# Patient Record
Sex: MALE | Race: WHITE | NOT HISPANIC OR LATINO | URBAN - METROPOLITAN AREA
[De-identification: names, ages, dates, MRNs, and addresses within clinical notes are randomized per-mention and may not be internally consistent; named-entity substitution may affect disease eponyms.]

---

## 2018-03-11 ENCOUNTER — INPATIENT (INPATIENT)
Facility: HOSPITAL | Age: 63
LOS: 1 days | Discharge: HOME | End: 2018-03-13
Attending: INTERNAL MEDICINE

## 2018-03-11 VITALS
TEMPERATURE: 97 F | HEART RATE: 77 BPM | RESPIRATION RATE: 18 BRPM | SYSTOLIC BLOOD PRESSURE: 158 MMHG | DIASTOLIC BLOOD PRESSURE: 93 MMHG | OXYGEN SATURATION: 97 %

## 2018-03-11 LAB
ALBUMIN SERPL ELPH-MCNC: 3.9 G/DL — SIGNIFICANT CHANGE UP (ref 3–5.5)
ALP SERPL-CCNC: 46 U/L — SIGNIFICANT CHANGE UP (ref 30–115)
ALT FLD-CCNC: <5 U/L — SIGNIFICANT CHANGE UP (ref 0–41)
ANION GAP SERPL CALC-SCNC: 3 MMOL/L — LOW (ref 7–14)
AST SERPL-CCNC: <5 U/L — SIGNIFICANT CHANGE UP (ref 0–41)
BASOPHILS # BLD AUTO: 0.05 K/UL — SIGNIFICANT CHANGE UP (ref 0–0.2)
BASOPHILS NFR BLD AUTO: 0.7 % — SIGNIFICANT CHANGE UP (ref 0–1)
BILIRUB SERPL-MCNC: 3.3 MG/DL — HIGH (ref 0.2–1.2)
BUN SERPL-MCNC: 22 MG/DL — HIGH (ref 10–20)
CALCIUM SERPL-MCNC: 8.7 MG/DL — SIGNIFICANT CHANGE UP (ref 8.5–10.1)
CHLORIDE SERPL-SCNC: 109 MMOL/L — SIGNIFICANT CHANGE UP (ref 98–110)
CK MB BLD-MCNC: 1 % — SIGNIFICANT CHANGE UP (ref 0–4)
CK MB CFR SERPL CALC: 3.1 NG/ML — SIGNIFICANT CHANGE UP (ref 0.6–6.3)
CK SERPL-CCNC: 334 U/L — HIGH (ref 0–225)
CO2 SERPL-SCNC: 23 MMOL/L — SIGNIFICANT CHANGE UP (ref 17–32)
CREAT SERPL-MCNC: 1 MG/DL — SIGNIFICANT CHANGE UP (ref 0.7–1.5)
EOSINOPHIL # BLD AUTO: 0.17 K/UL — SIGNIFICANT CHANGE UP (ref 0–0.7)
EOSINOPHIL NFR BLD AUTO: 2.4 % — SIGNIFICANT CHANGE UP (ref 0–8)
GLUCOSE SERPL-MCNC: 104 MG/DL — SIGNIFICANT CHANGE UP (ref 70–110)
HCT VFR BLD CALC: 43.4 % — SIGNIFICANT CHANGE UP (ref 42–52)
HGB BLD-MCNC: 14.6 G/DL — SIGNIFICANT CHANGE UP (ref 14–18)
IMM GRANULOCYTES NFR BLD AUTO: 0.4 % — HIGH (ref 0.1–0.3)
LYMPHOCYTES # BLD AUTO: 2.33 K/UL — SIGNIFICANT CHANGE UP (ref 1.2–3.4)
LYMPHOCYTES # BLD AUTO: 33 % — SIGNIFICANT CHANGE UP (ref 20.5–51.1)
MCHC RBC-ENTMCNC: 28.2 PG — SIGNIFICANT CHANGE UP (ref 27–31)
MCHC RBC-ENTMCNC: 33.6 G/DL — SIGNIFICANT CHANGE UP (ref 32–37)
MCV RBC AUTO: 83.8 FL — SIGNIFICANT CHANGE UP (ref 80–94)
MONOCYTES # BLD AUTO: 0.52 K/UL — SIGNIFICANT CHANGE UP (ref 0.1–0.6)
MONOCYTES NFR BLD AUTO: 7.4 % — SIGNIFICANT CHANGE UP (ref 1.7–9.3)
NEUTROPHILS # BLD AUTO: 3.96 K/UL — SIGNIFICANT CHANGE UP (ref 1.4–6.5)
NEUTROPHILS NFR BLD AUTO: 56.1 % — SIGNIFICANT CHANGE UP (ref 42.2–75.2)
POTASSIUM SERPL-MCNC: 10.6 MMOL/L — CRITICAL HIGH (ref 3.5–5)
POTASSIUM SERPL-SCNC: 10.6 MMOL/L — CRITICAL HIGH (ref 3.5–5)
PROT SERPL-MCNC: SIGNIFICANT CHANGE UP G/DL (ref 6–8)
RBC # BLD: 5.18 M/UL — SIGNIFICANT CHANGE UP (ref 4.7–6.1)
RBC # FLD: 12.5 % — SIGNIFICANT CHANGE UP (ref 11.5–14.5)
SODIUM SERPL-SCNC: 135 MMOL/L — SIGNIFICANT CHANGE UP (ref 135–146)
TROPONIN I SERPL-MCNC: 0.02 NG/ML — SIGNIFICANT CHANGE UP (ref 0–0.05)
WBC # BLD: 7.06 K/UL — SIGNIFICANT CHANGE UP (ref 4.8–10.8)
WBC # FLD AUTO: 7.06 K/UL — SIGNIFICANT CHANGE UP (ref 4.8–10.8)

## 2018-03-11 RX ORDER — ASPIRIN/CALCIUM CARB/MAGNESIUM 324 MG
325 TABLET ORAL ONCE
Qty: 0 | Refills: 0 | Status: COMPLETED | OUTPATIENT
Start: 2018-03-11 | End: 2018-03-11

## 2018-03-11 RX ORDER — SODIUM CHLORIDE 9 MG/ML
3 INJECTION INTRAMUSCULAR; INTRAVENOUS; SUBCUTANEOUS ONCE
Qty: 0 | Refills: 0 | Status: COMPLETED | OUTPATIENT
Start: 2018-03-11 | End: 2018-03-11

## 2018-03-11 RX ADMIN — Medication 325 MILLIGRAM(S): at 22:52

## 2018-03-11 RX ADMIN — SODIUM CHLORIDE 3 MILLILITER(S): 9 INJECTION INTRAMUSCULAR; INTRAVENOUS; SUBCUTANEOUS at 22:52

## 2018-03-12 LAB
ALBUMIN SERPL ELPH-MCNC: 4.4 G/DL — SIGNIFICANT CHANGE UP (ref 3–5.5)
ALP SERPL-CCNC: 48 U/L — SIGNIFICANT CHANGE UP (ref 30–115)
ALT FLD-CCNC: 19 U/L — SIGNIFICANT CHANGE UP (ref 0–41)
ANION GAP SERPL CALC-SCNC: 9 MMOL/L — SIGNIFICANT CHANGE UP (ref 7–14)
AST SERPL-CCNC: 19 U/L — SIGNIFICANT CHANGE UP (ref 0–41)
BILIRUB DIRECT SERPL-MCNC: 0.2 MG/DL — SIGNIFICANT CHANGE UP (ref 0–0.2)
BILIRUB INDIRECT FLD-MCNC: 1 MG/DL — SIGNIFICANT CHANGE UP
BILIRUB SERPL-MCNC: 1.2 MG/DL — SIGNIFICANT CHANGE UP (ref 0.2–1.2)
BUN SERPL-MCNC: 18 MG/DL — SIGNIFICANT CHANGE UP (ref 10–20)
CALCIUM SERPL-MCNC: 9.5 MG/DL — SIGNIFICANT CHANGE UP (ref 8.5–10.1)
CHLORIDE SERPL-SCNC: 106 MMOL/L — SIGNIFICANT CHANGE UP (ref 98–110)
CHOLEST SERPL-MCNC: 273 MG/DL — HIGH (ref 100–200)
CK MB CFR SERPL CALC: 3.2 NG/ML — SIGNIFICANT CHANGE UP (ref 0.6–6.3)
CO2 SERPL-SCNC: 25 MMOL/L — SIGNIFICANT CHANGE UP (ref 17–32)
CREAT SERPL-MCNC: 1.1 MG/DL — SIGNIFICANT CHANGE UP (ref 0.7–1.5)
GAS PNL BLDV: SIGNIFICANT CHANGE UP
GLUCOSE SERPL-MCNC: 99 MG/DL — SIGNIFICANT CHANGE UP (ref 70–110)
HCT VFR BLD CALC: 46 % — SIGNIFICANT CHANGE UP (ref 42–52)
HDLC SERPL-MCNC: 44 MG/DL — SIGNIFICANT CHANGE UP (ref 40–60)
HGB BLD-MCNC: 15.4 G/DL — SIGNIFICANT CHANGE UP (ref 14–18)
LIPID PNL WITH DIRECT LDL SERPL: 183 MG/DL — HIGH (ref 50–100)
MCHC RBC-ENTMCNC: 28.1 PG — SIGNIFICANT CHANGE UP (ref 27–31)
MCHC RBC-ENTMCNC: 33.5 G/DL — SIGNIFICANT CHANGE UP (ref 32–37)
MCV RBC AUTO: 83.9 FL — SIGNIFICANT CHANGE UP (ref 80–94)
NRBC # BLD: 0 /100 WBCS — SIGNIFICANT CHANGE UP (ref 0–0)
PLATELET # BLD AUTO: 119 K/UL — LOW (ref 130–400)
PLATELET # BLD AUTO: 192 K/UL — SIGNIFICANT CHANGE UP (ref 130–400)
POTASSIUM SERPL-MCNC: 4 MMOL/L — SIGNIFICANT CHANGE UP (ref 3.5–5)
POTASSIUM SERPL-SCNC: 4 MMOL/L — SIGNIFICANT CHANGE UP (ref 3.5–5)
PROT SERPL-MCNC: 6.8 G/DL — SIGNIFICANT CHANGE UP (ref 6–8)
RBC # BLD: 5.48 M/UL — SIGNIFICANT CHANGE UP (ref 4.7–6.1)
RBC # FLD: 12.3 % — SIGNIFICANT CHANGE UP (ref 11.5–14.5)
SODIUM SERPL-SCNC: 140 MMOL/L — SIGNIFICANT CHANGE UP (ref 135–146)
TOTAL CHOLESTEROL/HDL RATIO MEASUREMENT: 6.2 RATIO — HIGH (ref 4–5.5)
TRIGL SERPL-MCNC: 193 MG/DL — HIGH (ref 40–150)
TROPONIN I SERPL-MCNC: 0.04 NG/ML — SIGNIFICANT CHANGE UP (ref 0–0.05)
WBC # BLD: 7.4 K/UL — SIGNIFICANT CHANGE UP (ref 4.8–10.8)
WBC # FLD AUTO: 7.4 K/UL — SIGNIFICANT CHANGE UP (ref 4.8–10.8)

## 2018-03-12 RX ORDER — DOCUSATE SODIUM 100 MG
100 CAPSULE ORAL THREE TIMES A DAY
Qty: 0 | Refills: 0 | Status: DISCONTINUED | OUTPATIENT
Start: 2018-03-12 | End: 2018-03-13

## 2018-03-12 RX ORDER — SENNA PLUS 8.6 MG/1
2 TABLET ORAL AT BEDTIME
Qty: 0 | Refills: 0 | Status: DISCONTINUED | OUTPATIENT
Start: 2018-03-12 | End: 2018-03-13

## 2018-03-12 RX ORDER — VERAPAMIL HCL 240 MG
240 CAPSULE, EXTENDED RELEASE PELLETS 24 HR ORAL DAILY
Qty: 0 | Refills: 0 | Status: DISCONTINUED | OUTPATIENT
Start: 2018-03-12 | End: 2018-03-13

## 2018-03-12 RX ORDER — ATORVASTATIN CALCIUM 80 MG/1
1 TABLET, FILM COATED ORAL
Qty: 0 | Refills: 0 | COMMUNITY

## 2018-03-12 RX ORDER — ENOXAPARIN SODIUM 100 MG/ML
40 INJECTION SUBCUTANEOUS AT BEDTIME
Qty: 0 | Refills: 0 | Status: DISCONTINUED | OUTPATIENT
Start: 2018-03-12 | End: 2018-03-13

## 2018-03-12 RX ORDER — PROPRANOLOL HCL 160 MG
40 CAPSULE, EXTENDED RELEASE 24HR ORAL DAILY
Qty: 0 | Refills: 0 | Status: DISCONTINUED | OUTPATIENT
Start: 2018-03-12 | End: 2018-03-13

## 2018-03-12 RX ORDER — VALSARTAN 80 MG/1
80 TABLET ORAL DAILY
Qty: 0 | Refills: 0 | Status: DISCONTINUED | OUTPATIENT
Start: 2018-03-12 | End: 2018-03-13

## 2018-03-12 RX ORDER — ACETAMINOPHEN 500 MG
650 TABLET ORAL EVERY 6 HOURS
Qty: 0 | Refills: 0 | Status: DISCONTINUED | OUTPATIENT
Start: 2018-03-12 | End: 2018-03-13

## 2018-03-12 RX ORDER — ASPIRIN/CALCIUM CARB/MAGNESIUM 324 MG
325 TABLET ORAL DAILY
Qty: 0 | Refills: 0 | Status: DISCONTINUED | OUTPATIENT
Start: 2018-03-12 | End: 2018-03-13

## 2018-03-12 RX ORDER — ADENOSINE 3 MG/ML
60 INJECTION INTRAVENOUS ONCE
Qty: 0 | Refills: 0 | Status: COMPLETED | OUTPATIENT
Start: 2018-03-12 | End: 2018-03-13

## 2018-03-12 RX ORDER — MORPHINE SULFATE 50 MG/1
5 CAPSULE, EXTENDED RELEASE ORAL EVERY 4 HOURS
Qty: 0 | Refills: 0 | Status: DISCONTINUED | OUTPATIENT
Start: 2018-03-12 | End: 2018-03-13

## 2018-03-12 RX ORDER — VALSARTAN 80 MG/1
0 TABLET ORAL
Qty: 30 | Refills: 0 | COMMUNITY

## 2018-03-12 RX ORDER — VERAPAMIL HCL 240 MG
0 CAPSULE, EXTENDED RELEASE PELLETS 24 HR ORAL
Qty: 30 | Refills: 0 | COMMUNITY

## 2018-03-12 RX ORDER — ATORVASTATIN CALCIUM 80 MG/1
20 TABLET, FILM COATED ORAL AT BEDTIME
Qty: 0 | Refills: 0 | Status: DISCONTINUED | OUTPATIENT
Start: 2018-03-12 | End: 2018-03-13

## 2018-03-12 RX ADMIN — Medication 240 MILLIGRAM(S): at 06:16

## 2018-03-12 RX ADMIN — Medication 40 MILLIGRAM(S): at 06:16

## 2018-03-12 RX ADMIN — ATORVASTATIN CALCIUM 20 MILLIGRAM(S): 80 TABLET, FILM COATED ORAL at 22:34

## 2018-03-12 RX ADMIN — Medication 325 MILLIGRAM(S): at 11:48

## 2018-03-12 RX ADMIN — Medication 100 MILLIGRAM(S): at 06:15

## 2018-03-12 RX ADMIN — VALSARTAN 80 MILLIGRAM(S): 80 TABLET ORAL at 06:16

## 2018-03-12 NOTE — ED PROVIDER NOTE - PROGRESS NOTE DETAILS
patient hasw improved I will admit for further monitoring with muliple sets of troponin and potential provacative testing

## 2018-03-12 NOTE — ED ADULT NURSE REASSESSMENT NOTE - NS ED NURSE REASSESS COMMENT FT1
Pt had 2nd episode of right sided chest pain, lasted approx 10 mins; EKG normal; pain has since subsided; attached to cardiac monitor

## 2018-03-12 NOTE — CONSULT NOTE ADULT - SUBJECTIVE AND OBJECTIVE BOX
Date of Admission: 3/11/2018    CHIEF COMPLAINT: chest pain    HISTORY OF PRESENT ILLNESS: 62y Male with PMH below presented to the hospital for two episodes of chest pain yesterday. first time was while walking his dog and second time while he was lying in bed. It was associated with some diaphoresis and some nausea.     PAST MEDICAL & SURGICAL HISTORY:  Dyslipidemia  Hypertension  Atrial fibrillation  No significant past surgical history    HEALTH ISSUES - PROBLEM Dx:        FAMILY HISTORY:  Family history of coronary artery disease (Father, Uncle)    Allergies    No Known Allergies    Intolerances    	  Home Medications:  aspirin 325 mg oral tablet: 1 tab(s) orally once a day (12 Mar 2018 02:15)  atorvastatin 20 mg oral tablet: 1 tab(s) orally once a day (12 Mar 2018 02:15)  propranolol 40 mg oral tablet: 1 tab(s) orally once a day (12 Mar 2018 02:15)  valsartan 80 mg oral tablet: 1 tab(s) orally once a day (12 Mar 2018 02:15)  verapamil 240 mg/24 hours oral capsule, extended release: 1 cap(s) orally once a day (12 Mar 2018 02:15)    MEDICATIONS  (STANDING):  aspirin 325 milliGRAM(s) Oral daily  atorvastatin 20 milliGRAM(s) Oral at bedtime  docusate sodium 100 milliGRAM(s) Oral three times a day  enoxaparin Injectable 40 milliGRAM(s) SubCutaneous at bedtime  propranolol 40 milliGRAM(s) Oral daily  valsartan 80 milliGRAM(s) Oral daily  verapamil  milliGRAM(s) Oral daily    MEDICATIONS  (PRN):  acetaminophen   Tablet. 650 milliGRAM(s) Oral every 6 hours PRN Mild Pain (1 - 3)  morphine  - Injectable 5 milliGRAM(s) IV Push every 4 hours PRN Moderate Pain (4 - 6)  senna 2 Tablet(s) Oral at bedtime PRN Constipation              SOCIAL HISTORY:    [ ] Non-smoker  [ ] Smoker  [ ] Alcohol      REVIEW OF SYSTEMS:  CONSTITUTIONAL: No fever, weight loss, or fatigue  CARDIOLOGY: see HPI  RESPIRATORY: See HPI  NEUROLOGICAL: NO weakness, no focal deficits to report.  ENDOCRINOLOGICAL: no recent change in diabetic medications.   GI: no BRBPR, no N,V,diarrhea.    PSYCHIATRY: normal mood and affect  HEENT: no nasal discharge, no ecchymosis  SKIN: no ecchymosis, no breakdown  MUSCULOSKELETAL: Full range of motion x4.      PHYSICAL EXAM:  T(C): 36.9 (03-12-18 @ 07:35), Max: 36.9 (03-12-18 @ 07:35)  HR: 70 (03-12-18 @ 07:35) (68 - 82)  BP: 118/81 (03-12-18 @ 07:35) (117/79 - 158/93)  RR: 18 (03-12-18 @ 07:35) (18 - 18)  SpO2: 97% (03-12-18 @ 07:35) (95% - 98%)  Wt(kg): --  I&O's Summary    Daily     Daily     General Appearance: Normal	  Cardiovascular: Normal S1 S2, No JVD, No murmurs, No edema  Respiratory: Lungs clear to auscultation	  Psychiatry: A & O x 3, Mood & affect appropriate  Gastrointestinal:  Soft, Non-tender  Skin: No rashes, No ecchymoses, No cyanosis	  Neurologic: Non-focal  Extremities: Normal range of motion, No clubbing, cyanosis or edema  Vascular: Peripheral pulses palpable 2+ bilaterally        LABS:	 	                          15.4   7.40  )-----------( 192      ( 12 Mar 2018 07:09 )             46.0     03-12    140  |  106  |  18  ----------------------------<  99  4.0   |  25  |  1.1    Ca    9.5      12 Mar 2018 07:09    TPro  6.8  /  Alb  4.4  /  TBili  1.2  /  DBili  0.2  /  AST  19  /  ALT  19  /  AlkPhos  48  03-12    CARDIAC MARKERS ( 12 Mar 2018 07:09 )  0.04 ng/mL / x     / x     / x     / 3.2 ng/mL  CARDIAC MARKERS ( 11 Mar 2018 22:30 )  0.02 ng/mL / x     / 334 U/L / x     / 3.1 ng/mL          proBNP:   Lipid Profile:   HgA1c:   TSH:       CARDIAC MARKERS:  Troponin I, Serum: 0.04 ng/mL (03-12 @ 07:09)  Troponin I, Serum: 0.02 ng/mL (03-11 @ 22:30)            TELEMETRY EVENTS: 	    ECG:  	  RADIOLOGY:  OTHER: 	    PREVIOUS DIAGNOSTIC TESTING:    [ ] Echocardiogram:  [ ]  Catheterization:  [ ] Stress Test:  	  	  ASSESSMENT/PLAN: Date of Admission: 3/11/2018    CHIEF COMPLAINT: chest pain    HISTORY OF PRESENT ILLNESS: 62y Male with PMH below presented to the hospital for two episodes of chest pain yesterday. first time was while walking his dog and second time while he was lying in bed. It was associated with some diaphoresis and some nausea. Pain is right sided and intermittent. Had independent of these symptoms one episode of shortness of breath while walking his dog. NO exertional symptoms.     PAST MEDICAL & SURGICAL HISTORY:  Dyslipidemia  Hypertension  Atrial fibrillation  No significant past surgical history    HEALTH ISSUES - PROBLEM Dx:        FAMILY HISTORY:  Family history of coronary artery disease (Father, Uncle)    Allergies    No Known Allergies    Intolerances    	  Home Medications:  aspirin 325 mg oral tablet: 1 tab(s) orally once a day (12 Mar 2018 02:15)  atorvastatin 20 mg oral tablet: 1 tab(s) orally once a day (12 Mar 2018 02:15)  propranolol 40 mg oral tablet: 1 tab(s) orally once a day (12 Mar 2018 02:15)  valsartan 80 mg oral tablet: 1 tab(s) orally once a day (12 Mar 2018 02:15)  verapamil 240 mg/24 hours oral capsule, extended release: 1 cap(s) orally once a day (12 Mar 2018 02:15)    MEDICATIONS  (STANDING):  aspirin 325 milliGRAM(s) Oral daily  atorvastatin 20 milliGRAM(s) Oral at bedtime  docusate sodium 100 milliGRAM(s) Oral three times a day  enoxaparin Injectable 40 milliGRAM(s) SubCutaneous at bedtime  propranolol 40 milliGRAM(s) Oral daily  valsartan 80 milliGRAM(s) Oral daily  verapamil  milliGRAM(s) Oral daily    MEDICATIONS  (PRN):  acetaminophen   Tablet. 650 milliGRAM(s) Oral every 6 hours PRN Mild Pain (1 - 3)  morphine  - Injectable 5 milliGRAM(s) IV Push every 4 hours PRN Moderate Pain (4 - 6)  senna 2 Tablet(s) Oral at bedtime PRN Constipation              SOCIAL HISTORY:    [ ] Non-smoker  [ ] Smoker  [ ] Alcohol      REVIEW OF SYSTEMS:  CONSTITUTIONAL: No fever, weight loss, or fatigue  CARDIOLOGY: see HPI  RESPIRATORY: See HPI  NEUROLOGICAL: NO weakness, no focal deficits to report.  ENDOCRINOLOGICAL: no recent change in diabetic medications.   GI: no BRBPR, no N,V,diarrhea.    PSYCHIATRY: normal mood and affect  HEENT: no nasal discharge, no ecchymosis  SKIN: no ecchymosis, no breakdown  MUSCULOSKELETAL: Full range of motion x4.      PHYSICAL EXAM:  T(C): 36.9 (03-12-18 @ 07:35), Max: 36.9 (03-12-18 @ 07:35)  HR: 70 (03-12-18 @ 07:35) (68 - 82)  BP: 118/81 (03-12-18 @ 07:35) (117/79 - 158/93)  RR: 18 (03-12-18 @ 07:35) (18 - 18)  SpO2: 97% (03-12-18 @ 07:35) (95% - 98%)  Wt(kg): --  I&O's Summary    Daily     Daily     General Appearance: Normal	  Cardiovascular: Normal S1 S2, No JVD, No murmurs, No edema  Respiratory: Lungs clear to auscultation	  Psychiatry: A & O x 3, Mood & affect appropriate  Gastrointestinal:  Soft, Non-tender  Skin: No rashes, No ecchymoses, No cyanosis	  Neurologic: Non-focal  Extremities: Normal range of motion, No clubbing, cyanosis or edema  Vascular: Peripheral pulses palpable 2+ bilaterally        LABS:	 	                          15.4   7.40  )-----------( 192      ( 12 Mar 2018 07:09 )             46.0     03-12    140  |  106  |  18  ----------------------------<  99  4.0   |  25  |  1.1    Ca    9.5      12 Mar 2018 07:09    TPro  6.8  /  Alb  4.4  /  TBili  1.2  /  DBili  0.2  /  AST  19  /  ALT  19  /  AlkPhos  48  03-12    CARDIAC MARKERS ( 12 Mar 2018 07:09 )  0.04 ng/mL / x     / x     / x     / 3.2 ng/mL  CARDIAC MARKERS ( 11 Mar 2018 22:30 )  0.02 ng/mL / x     / 334 U/L / x     / 3.1 ng/mL          proBNP:   Lipid Profile:   HgA1c:   TSH:       CARDIAC MARKERS:  Troponin I, Serum: 0.04 ng/mL (03-12 @ 07:09)  Troponin I, Serum: 0.02 ng/mL (03-11 @ 22:30)            TELEMETRY EVENTS: 	    ECG:  	  RADIOLOGY:  OTHER: 	    PREVIOUS DIAGNOSTIC TESTING:    [ ] Echocardiogram:  [ ]  Catheterization:  [ ] Stress Test:  	  	  ASSESSMENT/PLAN:

## 2018-03-12 NOTE — H&P ADULT - HISTORY OF PRESENT ILLNESS
62M w pAfib not on AC, HTN  - presents with complaints of chest pressure that started around 8 pm on day of presentation  - pain: center chest. pressure. radiating to R arm. + SOB. + diaphoresis. + lightheadeness. + nausea. Lasted 5 minutes.  - was lying in bed when this happened. almost passed out when EMS was getting him into the ambulance  - had similar chest pressure this am when he was walking his dog, up a hill  - had similar symptoms again in the hospital upon arrival  - never had pain like this before. Usually walks everyday, almost 1 mile    - negative fever, chills, diarrhea

## 2018-03-12 NOTE — CONSULT NOTE ADULT - ASSESSMENT
Atypical chest pain  - no clear MI  - echo seen and reviewed  - c/w aspirin, statin for now  - NPO after midnight for exercise nuclear stress test.   - patient aware and understands

## 2018-03-12 NOTE — H&P ADULT - FAMILY HISTORY
Father  Still living? No  Family history of coronary artery disease, Age at diagnosis: Age Unknown     Uncle  Still living? Unknown  Family history of coronary artery disease, Age at diagnosis: Age Unknown

## 2018-03-12 NOTE — H&P ADULT - NSHPLABSRESULTS_GEN_ALL_CORE
LABS    CBC                        14.6   7.06  )-----------( 119      ( 11 Mar 2018 22:30 )             43.4     ----------------------------------------------------------------------------------------------------  CMP  03-11    135  |  109  |  22<H>  ----------------------------<  104  10.6<HH>   |  23  |  1.0    Ca    8.7      11 Mar 2018 22:30    TPro  TNP  /  Alb  3.9  /  TBili  3.3<H>  /  DBili  x   /  AST  <5  /  ALT  <5  /  AlkPhos  46  03-11      ----------------------------------------------------------------------------------------------------  Cardiac Enzyme  CARDIAC MARKERS ( 11 Mar 2018 22:30 )  0.02 ng/mL / x     / 334 U/L / x     / 3.1 ng/mL LABS    CBC                        14.6   7.06  )-----------( 119      ( 11 Mar 2018 22:30 )             43.4     ----------------------------------------------------------------------------------------------------  CMP  03-11    135  |  109  |  22<H>  ----------------------------<  104    10.6<HH>   |  23  |  1.0    Ca    8.7      11 Mar 2018 22:30    VBG: K: 3.8    TPro  TNP  /  Alb  3.9  /  TBili  3.3<H>  /  DBili  x   /  AST  <5  /  ALT  <5  /  AlkPhos  46  03-11          ----------------------------------------------------------------------------------------------------  Cardiac Enzyme  CARDIAC MARKERS ( 11 Mar 2018 22:30 )  0.02 ng/mL / x     / 334 U/L / x     / 3.1 ng/mL

## 2018-03-12 NOTE — ED PROVIDER NOTE - ATTENDING CONTRIBUTION TO CARE
I have personally performed a history and physical exam on this patient and personally directed the management of the patient. Patient presents with chest pain located on the upper right side it is intermittent here accompanied with diaphoresis and pain.  On physical exam he is nc/at perrla eomi oropharynx clear cta b/l, rrr s1s2 noted radial pulses 2 +=, pedal pulses 2 +=, no edema noted.  WE obtained ekg which is nsr, we obtained labs including troponin which is negative. during his stay here in the ed he developed a second bout of pain an ekg was repeated and is negative I will admit for further management at this time.

## 2018-03-12 NOTE — ED PROVIDER NOTE - OBJECTIVE STATEMENT
patient presents for evaluation of chest pain.  He notes initially pain started suddenly at rest approx 5 hours prior to arrival it is located on the upper right side of the chest with radiation to the right arm as well as midline, he denies any associated shortnes of breath he denies any palpitations.  He has a history of afib in the past.  He denies any fevers or chills he denies any vomiting.

## 2018-03-12 NOTE — H&P ADULT - ATTENDING COMMENTS
62M w pAfib not on AC, HTN  - presents with complaints of chest pressure that started around 8 pm on day of presentation  - pain: center chest. pressure. radiating to R arm. + SOB. + diaphoresis. + lightheadeness, + nausea. Lasted 5 minutes.  - was lying in bed when this happened. almost passed out when EMS was getting him into the ambulance  - had similar chest pressure this am when he was walking his dog, up a hill  - had similar symptoms again in the hospital upon arrival  - never had pain like this before. Usually walks everyday, almost 1 mile    12 Lead ECG (03.11.18 @ 23:49) >    Ventricular Rate 62 BPM    Atrial Rate 62 BPM    P-R Interval 180 ms    QRS Duration 76 ms    Q-T Interval 406 ms    QTC Calculation(Bezet) 412 ms    P Axis 52 degrees    R Axis 33 degrees    T Axis 16 degrees    Diagnosis Line Normal sinus rhythm  Low voltage QRS  Borderline ECG    Confirmed by Cem Ram ([id number]) on 3/12/2018 5:15:23 AM    < end of copied text >      REVIEW OF SYSTEMS:  CONSTITUTIONAL: No weakness, fevers or chills, (+) diaphoresis  EYES/ENT: No visual changes;  No vertigo or throat pain   NECK: No pain or stiffness  RESPIRATORY: No cough, wheezing, hemoptysis; No shortness of breath  CARDIOVASCULAR: (+) chest pain or palpitations  GASTROINTESTINAL: No abdominal or epigastric pain. No nausea, vomiting, or hematemesis; No diarrhea or constipation. No melena or hematochezia.  GENITOURINARY: No dysuria, frequency or hematuria  NEUROLOGICAL: No numbness or weakness  SKIN: No itching, rashes    Physical Exam:  General: WN/WD NAD  Neurology: A&Ox3, nonfocal, follows commands  Eyes: PERRLA/ EOMI  ENT/Neck: Neck supple, trachea midline, No JVD  Respiratory: CTA B/L, No wheezing, rales, rhonchi  CV: Normal rate regular rhythm, S1S2, no murmurs, rubs or gallops  Abdominal: Soft, NT, ND +BS,   Extremities: No edema, + peripheral pulses  Skin: No Rashes, Hematoma, Ecchymosis  Incisions: N/A  Tubes:N/A    Susp ACS r/o MI  - serial EKG and Araceli  -2Decho  -ASA, statin, BBlocker  -Cardiology  P.A.FIb ( currently sinus)  - c/w ASA/BBlocker and CCB  HTN  -c/w above Rx  Dyslipidemia   -as above  DVT prophylaxis

## 2018-03-12 NOTE — H&P ADULT - ASSESSMENT
This is a 62M with HTN, pAfib who presents with chest pressure    # chest pressure  - rule out ACS: trend cardiac enzyme  - likely an angina  - will get cardiology eval: requesting Dr. Mendez  - pain control    # HTN  - c/w valsartan, verapamil    # pAfib  - CHADVASc: 1  - c/w   - propranolol    # DLD  - atorvastatin    # dispo  - pending cardiac workup This is a 62M with HTN, pAfib who presents with chest pressure    # chest pressure  - likely an angina  - rule out ACS: trend cardiac enzyme  - will get cardiology eval: requesting Dr. Mendez  - pain control with tyelnol and morphine  - 2D echo    # HTN  - c/w valsartan, verapamil    # pAfib  - CHADVASc: 1  - c/w   - propranolol    # DLD  - atorvastatin    # dispo  - pending cardiac workup This is a 62M with HTN, pAfib who presents with chest pressure    # chest pressure  - likely an angina  - rule out ACS: trend cardiac enzyme  - will get cardiology eval: requesting Dr. Mendez  - pain control with tyelnol and morphine  - 2D echo    # HTN  - c/w valsartan, verapamil    # pAfib  - CHADVASc: 1  - c/w   - propranolol    # DLD  - atorvastatin    # hyperkalemia  - was not real, was hemolyzed, repeat VBG has normal K    # hyperbilirubinemia  - likely secondary to hemolysis  - follow up repeat LFT in am    # dispo  - pending cardiac workup This is a 62M with HTN, pAfib who presents with chest pressure    # chest pressure  - likely an angina  - rule out ACS: trend cardiac enzyme  - will get cardiology eval: requesting Dr. Mendez  - pain control with tyelnol and morphine  - 2D echo    # HTN  - c/w valsartan, verapamil    # pAfib  - CHADVASc: 1  - c/w   - propranolol    # DLD  - c/w atorvastatin 20  - will repeat a lipid profile  - consider increasing atorvastatin to 40    # hyperkalemia  - was not real, was hemolyzed, repeat VBG has normal K    # hyperbilirubinemia  - likely secondary to hemolysis  - follow up repeat LFT in am    # dispo  - pending cardiac workup

## 2018-03-12 NOTE — CONSULT NOTE ADULT - ATTENDING COMMENTS
Pt seen by me in ER and currently without symptoms. History remote afib HTN and syncope with abnormal tilt test. Yesterday 3 episodes right  sided localized chest pain with radiation to rt arm. Pain questionably positional. Normal BP now. EKGs all normal. Negative Troponinx2. Normal echo except GR1 diastolic dysfunction. Elevated CHOL to 270 and [id number] on provachol 20. Adenosine nuclear stress test borderline abnormal EKG with 1mm st depression but normal l nuclear images. Pt can be discharged to office follow up. Atypical pain with no ischemia by enzymes, EKG with pain normal in ER and normal nuclear images. Will tillman ASA and BPge Provachol to Lipitor 40 and get labs in 4-6 weeks. Cardiac cath or CTA  if recurrent symptoms occur. ASA and BP medication to continue.

## 2018-03-12 NOTE — H&P ADULT - NSHPPHYSICALEXAM_GEN_ALL_CORE
Vitals:   T(C): 36.3 (03-12-18 @ 00:25), Max: 36.3 (03-11-18 @ 21:32)  HR: 68 (03-12-18 @ 00:25) (68 - 77)  BP: 144/88 (03-12-18 @ 00:25) (144/88 - 158/93)  RR: 18 (03-12-18 @ 00:25) (18 - 18)  SpO2: 98% (03-12-18 @ 00:25) (97% - 98%)    Physical Exam  GENERAL: NAD, looks stated age  HEAD:  Atraumatic, normocephalic  EYES: EOMI, PERRLA, conjunctiva and sclera clear  NECK: Supple, no JVD  CHEST/LUNG: Clear to auscultation bilaterally; no wheeze; no crackles; no accessory muscles used  HEART: Regular rate and rhythm, S1, S2, no murmurs  ABDOMEN: Soft, nontender, nondistended; bowel sounds present; no guarding  EXTREMITIES:  2+ peripheral pulses, no cyanosis or edema  PSYCH: AAOx3  NEUROLOGY: non focal  SKIN: No rashes or lesions

## 2018-03-13 ENCOUNTER — TRANSCRIPTION ENCOUNTER (OUTPATIENT)
Age: 63
End: 2018-03-13

## 2018-03-13 VITALS
HEART RATE: 67 BPM | RESPIRATION RATE: 18 BRPM | DIASTOLIC BLOOD PRESSURE: 81 MMHG | SYSTOLIC BLOOD PRESSURE: 118 MMHG | TEMPERATURE: 97 F

## 2018-03-13 RX ORDER — ATORVASTATIN CALCIUM 80 MG/1
1 TABLET, FILM COATED ORAL
Qty: 30 | Refills: 0 | OUTPATIENT
Start: 2018-03-13 | End: 2018-04-11

## 2018-03-13 RX ADMIN — Medication 40 MILLIGRAM(S): at 05:45

## 2018-03-13 RX ADMIN — Medication 100 MILLIGRAM(S): at 05:45

## 2018-03-13 RX ADMIN — Medication 240 MILLIGRAM(S): at 05:45

## 2018-03-13 RX ADMIN — ADENOSINE 600 MILLIGRAM(S): 3 INJECTION INTRAVENOUS at 02:09

## 2018-03-13 RX ADMIN — VALSARTAN 80 MILLIGRAM(S): 80 TABLET ORAL at 05:45

## 2018-03-13 NOTE — DISCHARGE NOTE ADULT - PATIENT PORTAL LINK FT
You can access the [facility], offered by [facility], by registering with the following website: [email or web address]

## 2018-03-13 NOTE — PROGRESS NOTE ADULT - SUBJECTIVE AND OBJECTIVE BOX
AZUL BRANDON, male, 62y ([birth year]),   MRN-[id number]  Admit Date: 03-12-18 (1d)    Chief Complaint:  Patient is a 62y old  Male who presents with a chief complaint of chest pain (13 Mar 2018 00:45)      Past Medical and Surgical History:  PAST MEDICAL & SURGICAL HISTORY:  Dyslipidemia  Hypertension  Atrial fibrillation  No significant past surgical history      Current Medications:  MEDICATIONS  (STANDING):  aspirin 325 milliGRAM(s) Oral daily  atorvastatin 20 milliGRAM(s) Oral at bedtime  docusate sodium 100 milliGRAM(s) Oral three times a day  enoxaparin Injectable 40 milliGRAM(s) SubCutaneous at bedtime  propranolol 40 milliGRAM(s) Oral daily  valsartan 80 milliGRAM(s) Oral daily  verapamil  milliGRAM(s) Oral daily    MEDICATIONS  (PRN):  acetaminophen   Tablet. 650 milliGRAM(s) Oral every 6 hours PRN Mild Pain (1 - 3)  morphine  - Injectable 5 milliGRAM(s) IV Push every 4 hours PRN Moderate Pain (4 - 6)  senna 2 Tablet(s) Oral at bedtime PRN Constipation      Interval History:  No acute events overnight. No complaints at this time.    Vital Signs:  T(F): 97.3 (03-13-18 @ 04:59), Max: 98.5 (03-12-18 @ 07:35)  HR: 67 (03-13-18 @ 04:59) (59 - 82)  BP: 118/81 (03-13-18 @ 04:59) (117/79 - 158/93)  RR: 18 (03-13-18 @ 04:59) (18 - 18)  SpO2: 95% (03-12-18 @ 16:06) (95% - 98%)  CAPILLARY BLOOD GLUCOSE          Physical Exam:  General: Not in distress.   HEENT: Moist mucus membranes. PERRLA.  Cardio: Regular rate and rhythm, S1, S2, no murmur, rub, or gallop.  Pulm: Clear to auscultation bilaterally. No wheezing, rales, or rhonchi.  Abdomen: Soft, non-tender, non-distended. Normoactive bowel sounds.  Extremities: No cyanosis or edema bilaterally. No calf tenderness to palpation.  Neuro: A&O x3. No focal deficits. CN II - XII grossly intact.    Labs and Imaging:  CBC Full  -  ( 12 Mar 2018 07:09 )  WBC Count : 7.40 K/uL  Hemoglobin : 15.4 g/dL  Hematocrit : 46.0 %  Platelet Count - Automated : 192 K/uL  Mean Cell Volume : 83.9 fL  Mean Cell Hemoglobin : 28.1 pg  Mean Cell Hemoglobin Concentration : 33.5 g/dL  Auto Neutrophil # : x  Auto Lymphocyte # : x  Auto Monocyte # : x  Auto Eosinophil # : x  Auto Basophil # : x  Auto Neutrophil % : x  Auto Lymphocyte % : x  Auto Monocyte % : x  Auto Eosinophil % : x  Auto Basophil % : x    RDW:       LFTs: 03-12-18 @ 07:09  TP  6.8  | 4.4 Alb   ---------------  TB  1.2  | 0.2 DB   ---------------  ALT 19   | 19  AST          ^        48          ALK  LFTs: 03-11-18 @ 22:30  TP  TNP  | 3.9 Alb   ---------------  TB  3.3  | --  DB   ---------------  ALT <5   | <5  AST          ^        46          ALK          Cardiac Enzymes:  Troponin I, Serum: 0.04 ng/mL (03-12-18 @ 07:09)  Creatine Kinase, Serum: 334 U/L <H> (03-11-18 @ 22:30)  Troponin I, Serum: 0.02 ng/mL (03-11-18 @ 22:30)    Urinalysis:    Cultures:      Home Medications:  Home Medications:  aspirin 325 mg oral tablet: 1 tab(s) orally once a day (12 Mar 2018 02:15)  atorvastatin 20 mg oral tablet: 1 tab(s) orally once a day (12 Mar 2018 02:15)  propranolol 40 mg oral tablet: 1 tab(s) orally once a day (12 Mar 2018 02:15)  valsartan 80 mg oral tablet: 1 tab(s) orally once a day (12 Mar 2018 02:15)  verapamil 240 mg/24 hours oral capsule, extended release: 1 cap(s) orally once a day (12 Mar 2018 02:15)

## 2018-03-13 NOTE — PROGRESS NOTE ADULT - ASSESSMENT
63 yo M presented with chest pain, ACS ruled out.    Atypical Chest Pain  -3/12/18 Adenosine Nuclear Stress Test: Negative  -Continue with ASA/Statin  -Discharge planning

## 2018-03-13 NOTE — PROGRESS NOTE ADULT - ATTENDING COMMENTS
Patient seen and examined independently.  Case discussed with housestaff, nursing and patient/pt decision maker.   ASSESSMENT AND PLAN  CHEST PAIN-Nuclear Stress test was negative. and so were cardiac enzymes.  DYSLIPIDEMIA-He was not taking lipitor which was prescribed and dietary modification requested.  HX of PAROXYSMAL A FIB-not on AC as he took coumadin for 10yrs and his doctor stopped it. EKG-NSR rate 67/min.   Dc home today spent more than 30mins.

## 2018-03-13 NOTE — DISCHARGE NOTE ADULT - PLAN OF CARE
Complete Recovery s/p negative adenosine nuclear stress test, please monitor for recurrent symptoms of chest pain, palpitations, nausea, vomiting, dizziness and report to emergency services

## 2018-03-13 NOTE — DISCHARGE NOTE ADULT - MEDICATION SUMMARY - MEDICATIONS TO TAKE
I will START or STAY ON the medications listed below when I get home from the hospital:    aspirin 325 mg oral tablet  -- 1 tab(s) by mouth once a day  -- Indication: For Cad    valsartan 80 mg oral tablet  -- 1 tab(s) by mouth once a day  -- Indication: For Htn    verapamil 240 mg/24 hours oral capsule, extended release  -- 1 cap(s) by mouth once a day  -- Indication: For Afib    propranolol 40 mg oral tablet  -- 1 tab(s) by mouth once a day  -- Indication: For Afib    atorvastatin 20 mg oral tablet  -- 1 tab(s) by mouth once a day  -- Indication: For Dld

## 2018-03-13 NOTE — PROGRESS NOTE ADULT - SUBJECTIVE AND OBJECTIVE BOX
AZUL BRANDON  62y Male    INTERVAL HPI/OVERNIGHT EVENTS:    T(F): 97.3 (18 @ 04:59), Max: 97.3 (18 @ 16:06)  HR: 67 (18 @ 04:59) (59 - 67)  BP: 118/81 (18 @ 04:59) (118/81 - 137/76)  RR: 18 (18 @ 04:59) (18 - 18)  SpO2: 95% (18 @ 16:06) (95% - 95%)  I&O's Summary    12 Mar 2018 07:01  -  13 Mar 2018 07:00  --------------------------------------------------------  IN: 0 mL / OUT: 1 mL / NET: -1 mL      Daily     Daily Weight in k.1 (13 Mar 2018 01:30)  CAPILLARY BLOOD GLUCOSE            PHYSICAL EXAM:  GENERAL: NAD  HEAD:  Atraumatic, Normocephalic  EYES:  conjunctiva and sclera clear  ENMT: Moist mucous membranes  NECK: Supple, No JVD  NERVOUS SYSTEM:  Alert & Oriented X3, Good concentration  CHEST/LUNG: Clear to percussion bilaterally; No rales, rhonchi, wheezing  HEART: Regular rate and rhythm; No murmurs  ABDOMEN: Soft, Nontender, Nondistended; Bowel sounds present  EXTREMITIES:  2+ Peripheral Pulses, No edema  SKIN: No rashes or lesions    Consultant(s) Notes Reviewed:  [x ] YES  [ ] NO  Care Discussed with Consultants/Other Providers [ x] YES  [ ] NO    Medications reviewed  EKG reviewed  Telemetry reviewed    LABS:                        15.4   7.40  )-----------( 192      ( 12 Mar 2018 07:09 )             46.0     03-12    < from: Transthoracic Echocardiogram (18 @ 07:26) >   1. Left ventricular ejection fraction, by visual estimation, is 60 to   65%.   2. Normal global left ventricular systolic function.   3. Spectral Doppler shows impaired relaxation pattern of left   ventricular myocardial filling (Grade I diastolic dysfunction).    < end of copied text >  140  |  106  |  18  ----------------------------<  99  4.0   |  25  |  1.1    Ca    9.5      12 Mar 2018 07:09    TPro  6.8  /  Alb  4.4  /  TBili  1.2  /  DBili  0.2  /  AST  19  /  ALT  19  /  AlkPhos  48  03-12      CARDIAC MARKERS ( 12 Mar 2018 07:09 )  0.04 ng/mL / x     / x     / x     / 3.2 ng/mL  CARDIAC MARKERS ( 11 Mar 2018 22:30 )  0.02 ng/mL / x     / 334 U/L / x     / 3.1 ng/mL          RADIOLOGY & ADDITIONAL TESTS:    Imaging or report Personally Reviewed:  [+] YES  [ ] NO    < from: NM Nuclear Stress Pharmacologic Multiple (18 @ 18:02) >   NORMAL ADENOSINE / REST MYOCARDIAL PERFUSION TOMOGRAPHY, WITH NO   EVIDENCE FOR ISCHEMIA DURING ADENOSINE INFUSION.   2. NORMAL RESTING LEFT VENTRICULAR WALL MOTION AND WALL THICKENING.  3. LEFT VENTRICULAR EJECTION FRACTION OF  71% WHICH IS WITHIN RANGE OF   NORMAL.       < end of copied text >      Care discussed with pt

## 2018-03-13 NOTE — DISCHARGE NOTE ADULT - HOSPITAL COURSE
63 yo M presented with chest pain, cardiac enzymes and adenosine stress test negative, hospital course uncomplicated. Patient to follow up with Cardiology as outpatient for continued monitoring.

## 2018-03-13 NOTE — DISCHARGE NOTE ADULT - CARE PLAN
Principal Discharge DX:	Chest pain, unspecified type  Goal:	Complete Recovery  Assessment and plan of treatment:	s/p negative adenosine nuclear stress test, please monitor for recurrent symptoms of chest pain, palpitations, nausea, vomiting, dizziness and report to emergency services

## 2018-03-14 PROBLEM — I10 ESSENTIAL (PRIMARY) HYPERTENSION: Chronic | Status: ACTIVE | Noted: 2018-03-12

## 2018-03-14 PROBLEM — Z00.00 ENCOUNTER FOR PREVENTIVE HEALTH EXAMINATION: Status: ACTIVE | Noted: 2018-03-14

## 2018-03-14 PROBLEM — I48.91 UNSPECIFIED ATRIAL FIBRILLATION: Chronic | Status: ACTIVE | Noted: 2018-03-12

## 2018-03-14 PROBLEM — E78.5 HYPERLIPIDEMIA, UNSPECIFIED: Chronic | Status: ACTIVE | Noted: 2018-03-12

## 2018-03-15 DIAGNOSIS — I48.0 PAROXYSMAL ATRIAL FIBRILLATION: ICD-10-CM

## 2018-03-15 DIAGNOSIS — R07.9 CHEST PAIN, UNSPECIFIED: ICD-10-CM

## 2018-03-15 DIAGNOSIS — I10 ESSENTIAL (PRIMARY) HYPERTENSION: ICD-10-CM

## 2018-03-15 DIAGNOSIS — E78.5 HYPERLIPIDEMIA, UNSPECIFIED: ICD-10-CM

## 2018-03-15 DIAGNOSIS — E80.6 OTHER DISORDERS OF BILIRUBIN METABOLISM: ICD-10-CM

## 2018-03-15 DIAGNOSIS — Z87.891 PERSONAL HISTORY OF NICOTINE DEPENDENCE: ICD-10-CM

## 2018-03-23 ENCOUNTER — INPATIENT (INPATIENT)
Facility: HOSPITAL | Age: 63
LOS: 1 days | Discharge: HOME | End: 2018-03-25
Attending: INTERNAL MEDICINE

## 2018-03-23 VITALS
RESPIRATION RATE: 18 BRPM | SYSTOLIC BLOOD PRESSURE: 166 MMHG | HEART RATE: 68 BPM | OXYGEN SATURATION: 100 % | TEMPERATURE: 97 F | DIASTOLIC BLOOD PRESSURE: 81 MMHG

## 2018-03-23 LAB
ALBUMIN SERPL ELPH-MCNC: 4.7 G/DL — SIGNIFICANT CHANGE UP (ref 3.5–5.2)
ALP SERPL-CCNC: 57 U/L — SIGNIFICANT CHANGE UP (ref 30–115)
ALT FLD-CCNC: 14 U/L — SIGNIFICANT CHANGE UP (ref 0–41)
ANION GAP SERPL CALC-SCNC: 11 MMOL/L — SIGNIFICANT CHANGE UP (ref 7–14)
AST SERPL-CCNC: 14 U/L — SIGNIFICANT CHANGE UP (ref 0–41)
BASOPHILS # BLD AUTO: 0.06 K/UL — SIGNIFICANT CHANGE UP (ref 0–0.2)
BASOPHILS NFR BLD AUTO: 0.8 % — SIGNIFICANT CHANGE UP (ref 0–1)
BILIRUB SERPL-MCNC: 0.6 MG/DL — SIGNIFICANT CHANGE UP (ref 0.2–1.2)
BUN SERPL-MCNC: 25 MG/DL — HIGH (ref 10–20)
CALCIUM SERPL-MCNC: 9.1 MG/DL — SIGNIFICANT CHANGE UP (ref 8.5–10.1)
CHLORIDE SERPL-SCNC: 103 MMOL/L — SIGNIFICANT CHANGE UP (ref 98–110)
CK SERPL-CCNC: 87 U/L — SIGNIFICANT CHANGE UP (ref 0–225)
CO2 SERPL-SCNC: 27 MMOL/L — SIGNIFICANT CHANGE UP (ref 17–32)
CREAT SERPL-MCNC: 1.3 MG/DL — SIGNIFICANT CHANGE UP (ref 0.7–1.5)
EOSINOPHIL # BLD AUTO: 0.11 K/UL — SIGNIFICANT CHANGE UP (ref 0–0.7)
EOSINOPHIL NFR BLD AUTO: 1.5 % — SIGNIFICANT CHANGE UP (ref 0–8)
GLUCOSE SERPL-MCNC: 109 MG/DL — SIGNIFICANT CHANGE UP (ref 70–110)
HCT VFR BLD CALC: 43.9 % — SIGNIFICANT CHANGE UP (ref 42–52)
HGB BLD-MCNC: 15.4 G/DL — SIGNIFICANT CHANGE UP (ref 14–18)
IMM GRANULOCYTES NFR BLD AUTO: 0.3 % — SIGNIFICANT CHANGE UP (ref 0.1–0.3)
INR BLD: 0.97 RATIO — SIGNIFICANT CHANGE UP (ref 0.65–1.3)
LYMPHOCYTES # BLD AUTO: 2.17 K/UL — SIGNIFICANT CHANGE UP (ref 1.2–3.4)
LYMPHOCYTES # BLD AUTO: 30.1 % — SIGNIFICANT CHANGE UP (ref 20.5–51.1)
MCHC RBC-ENTMCNC: 28.9 PG — SIGNIFICANT CHANGE UP (ref 27–31)
MCHC RBC-ENTMCNC: 35.1 G/DL — SIGNIFICANT CHANGE UP (ref 32–37)
MCV RBC AUTO: 82.5 FL — SIGNIFICANT CHANGE UP (ref 80–94)
MONOCYTES # BLD AUTO: 0.63 K/UL — HIGH (ref 0.1–0.6)
MONOCYTES NFR BLD AUTO: 8.7 % — SIGNIFICANT CHANGE UP (ref 1.7–9.3)
NEUTROPHILS # BLD AUTO: 4.22 K/UL — SIGNIFICANT CHANGE UP (ref 1.4–6.5)
NEUTROPHILS NFR BLD AUTO: 58.6 % — SIGNIFICANT CHANGE UP (ref 42.2–75.2)
NRBC # BLD: 0 /100 WBCS — SIGNIFICANT CHANGE UP (ref 0–0)
PLATELET # BLD AUTO: 176 K/UL — SIGNIFICANT CHANGE UP (ref 130–400)
POTASSIUM SERPL-MCNC: 4.5 MMOL/L — SIGNIFICANT CHANGE UP (ref 3.5–5)
POTASSIUM SERPL-SCNC: 4.5 MMOL/L — SIGNIFICANT CHANGE UP (ref 3.5–5)
PROT SERPL-MCNC: 6.8 G/DL — SIGNIFICANT CHANGE UP (ref 6–8)
PROTHROM AB SERPL-ACNC: 10.5 SEC — SIGNIFICANT CHANGE UP (ref 9.95–12.87)
RBC # BLD: 5.32 M/UL — SIGNIFICANT CHANGE UP (ref 4.7–6.1)
RBC # FLD: 12.3 % — SIGNIFICANT CHANGE UP (ref 11.5–14.5)
SODIUM SERPL-SCNC: 141 MMOL/L — SIGNIFICANT CHANGE UP (ref 135–146)
TROPONIN T SERPL-MCNC: <0.01 NG/ML — SIGNIFICANT CHANGE UP
WBC # BLD: 7.21 K/UL — SIGNIFICANT CHANGE UP (ref 4.8–10.8)
WBC # FLD AUTO: 7.21 K/UL — SIGNIFICANT CHANGE UP (ref 4.8–10.8)

## 2018-03-23 RX ORDER — VERAPAMIL HCL 240 MG
1 CAPSULE, EXTENDED RELEASE PELLETS 24 HR ORAL
Qty: 0 | Refills: 0 | COMMUNITY

## 2018-03-23 RX ORDER — ASPIRIN/CALCIUM CARB/MAGNESIUM 324 MG
1 TABLET ORAL
Qty: 0 | Refills: 0 | COMMUNITY

## 2018-03-23 RX ORDER — SODIUM CHLORIDE 9 MG/ML
1000 INJECTION INTRAMUSCULAR; INTRAVENOUS; SUBCUTANEOUS
Qty: 0 | Refills: 0 | Status: DISCONTINUED | OUTPATIENT
Start: 2018-03-23 | End: 2018-03-25

## 2018-03-23 RX ORDER — VALSARTAN 80 MG/1
1 TABLET ORAL
Qty: 0 | Refills: 0 | COMMUNITY

## 2018-03-23 RX ORDER — ASPIRIN/CALCIUM CARB/MAGNESIUM 324 MG
325 TABLET ORAL DAILY
Qty: 0 | Refills: 0 | Status: DISCONTINUED | OUTPATIENT
Start: 2018-03-23 | End: 2018-03-23

## 2018-03-23 RX ORDER — SIMVASTATIN 20 MG/1
1 TABLET, FILM COATED ORAL
Qty: 0 | Refills: 0 | COMMUNITY

## 2018-03-23 RX ORDER — VALSARTAN 80 MG/1
80 TABLET ORAL DAILY
Qty: 0 | Refills: 0 | Status: DISCONTINUED | OUTPATIENT
Start: 2018-03-23 | End: 2018-03-25

## 2018-03-23 RX ORDER — PROPRANOLOL HCL 160 MG
40 CAPSULE, EXTENDED RELEASE 24HR ORAL DAILY
Qty: 0 | Refills: 0 | Status: DISCONTINUED | OUTPATIENT
Start: 2018-03-23 | End: 2018-03-25

## 2018-03-23 RX ORDER — TICAGRELOR 90 MG/1
90 TABLET ORAL
Qty: 0 | Refills: 0 | Status: DISCONTINUED | OUTPATIENT
Start: 2018-03-23 | End: 2018-03-25

## 2018-03-23 RX ORDER — ASPIRIN/CALCIUM CARB/MAGNESIUM 324 MG
324 TABLET ORAL ONCE
Qty: 0 | Refills: 0 | Status: COMPLETED | OUTPATIENT
Start: 2018-03-23 | End: 2018-03-23

## 2018-03-23 RX ORDER — ASPIRIN/CALCIUM CARB/MAGNESIUM 324 MG
81 TABLET ORAL DAILY
Qty: 0 | Refills: 0 | Status: DISCONTINUED | OUTPATIENT
Start: 2018-03-23 | End: 2018-03-25

## 2018-03-23 RX ORDER — ATORVASTATIN CALCIUM 80 MG/1
1 TABLET, FILM COATED ORAL
Qty: 0 | Refills: 0 | COMMUNITY

## 2018-03-23 RX ORDER — TICAGRELOR 90 MG/1
1 TABLET ORAL
Qty: 60 | Refills: 1 | OUTPATIENT
Start: 2018-03-23

## 2018-03-23 RX ORDER — HEPARIN SODIUM 5000 [USP'U]/ML
5000 INJECTION INTRAVENOUS; SUBCUTANEOUS EVERY 8 HOURS
Qty: 0 | Refills: 0 | Status: DISCONTINUED | OUTPATIENT
Start: 2018-03-23 | End: 2018-03-24

## 2018-03-23 RX ORDER — SIMVASTATIN 20 MG/1
40 TABLET, FILM COATED ORAL AT BEDTIME
Qty: 0 | Refills: 0 | Status: DISCONTINUED | OUTPATIENT
Start: 2018-03-23 | End: 2018-03-25

## 2018-03-23 RX ORDER — PROPRANOLOL HCL 160 MG
1 CAPSULE, EXTENDED RELEASE 24HR ORAL
Qty: 0 | Refills: 0 | COMMUNITY

## 2018-03-23 RX ORDER — VERAPAMIL HCL 240 MG
240 CAPSULE, EXTENDED RELEASE PELLETS 24 HR ORAL DAILY
Qty: 0 | Refills: 0 | Status: DISCONTINUED | OUTPATIENT
Start: 2018-03-23 | End: 2018-03-25

## 2018-03-23 RX ADMIN — HEPARIN SODIUM 5000 UNIT(S): 5000 INJECTION INTRAVENOUS; SUBCUTANEOUS at 22:16

## 2018-03-23 RX ADMIN — SIMVASTATIN 40 MILLIGRAM(S): 20 TABLET, FILM COATED ORAL at 22:16

## 2018-03-23 RX ADMIN — SODIUM CHLORIDE 75 MILLILITER(S): 9 INJECTION INTRAMUSCULAR; INTRAVENOUS; SUBCUTANEOUS at 21:46

## 2018-03-23 NOTE — PROGRESS NOTE ADULT - SUBJECTIVE AND OBJECTIVE BOX
PREOPERATIVE DAY OF PROCEDURE EVALUATION:  I have personally seen and examined the patient.  I agree with the history and physical which I have reviewed and noted any changes below.  (Signed electronically)  03-23-18 @ 15:31                                              POST OPERATIVE PROCEDURAL DOCUMENTATION  PRE-OP DIAGNOSIS:    POST-OP DIAGNOSIS:    PROCEDURE:   LEFT HEART CATHERIZATION    Physician: Flo Rebolledo MD  Assistant:  MD    ANESTHESIA TYPE:  [x ] Sedation  [x ] Local/Regional  [  ]General Anesthesia    ESTIMATED BLOOD LOSS:      less than 10 mL    CONDITION  [ x] Good  [   ] Fair  [   ] Serious  [   ] Critical      SPECIMENS REMOVED (IF APPLICABLE):   None      IMPLANTS (IF APPLICABLE)      FINDINGS:    LEFT HEART CATHERIZATION                                    LVEF%:  LVEDP:    Left main    LAD:                        Diag:     Left Circumflex:    OM:      Right Cornary Artery:   RPDA:    RPL:       RIGHT HEART CATHERIZATION  PA:  PCW:  CO/CI:    PERCUTANEOUS CORONARY INTERVENTIONS:      COMPLICATIONS:  None      POST-OP DIAGNOSIS    [ ] Normal Coronary Arteries  [ ] Luminal Irregularities  [ ] Non-obstructive CAD        PLAN OF CARE      [ ] D/C Home today   [ ]  D/C in AM  [ ] Return to In-patient bed  [ ] Admit for observation  [ ] Return for staged procedure:  [ ] CT Surgery consult called  [ ]  Continue DAPT, B-blocker & Statin therapy  [ ]  Medical Therapy  [ x] Aggressive risk factor modification. The patient should follow a low fat and low calorie diet.

## 2018-03-23 NOTE — PROGRESS NOTE ADULT - SUBJECTIVE AND OBJECTIVE BOX
LENGTH OF HOSPITAL STAY: day 1    CHIEF COMPLAINT:   Patient is a 62y old  Male who presents with a chief complaint of atypical chest pain with exertional sx (23 Mar 2018 11:34) noraml stress test but severe LAD stenosis on CTA    received pt from cath lab tonight  received CHRISTINA in LAD    HISTORY OF PRESENTING ILLNESS:    HPI:  61 y/o M with PMHx Afibx20 years (was on coumadin but was stopped "because he was doing well" about 10 years ago, HTN, DLD presenting for recurrent R sided chest and arm pain. Initially pt had sx 1 week ago when at rest he started experiencing R sided chest pain, described as pressure like, with radiation to the R arm. He denies any prior incident, the pain lasted for 15min. He sought medical attention at that time and had a nuclear stress test done, which was negative. Now pt is returning bc for past 3-4 days since discharge he is experiencing the same R chest and arm pain, coming and going, only when he exerts himself. He spoke with Dr Zhou, who told him to come back. Here CT Coronaries showed atherosclerotic disease within the LAD contributing up to to severe stenosis and an area of likely total occlusion at the origin of the first diagonal. (23 Mar 2018 11:34)    PAST MEDICAL & SURGICAL HISTORY  PAST MEDICAL & SURGICAL HISTORY:  Dyslipidemia  Hypertension  Atrial fibrillation  No significant past surgical history    SOCIAL HISTORY:  ex smoker quit 30 yrs ago  ALLERGIES:  No Known Allergies    Home Medications:  aspirin 325 mg oral tablet: 1 tab(s) orally once a day (23 Mar 2018 13:26)  propranolol 40 mg oral tablet: 1 tab(s) orally once a day (23 Mar 2018 13:26)  simvastatin 40 mg oral tablet: 1 tab(s) orally once a day (at bedtime) (23 Mar 2018 13:26)  valsartan 80 mg oral tablet: 1 tab(s) orally once a day (23 Mar 2018 13:26)  verapamil 240 mg/24 hours oral capsule, extended release: 1 cap(s) orally once a day (23 Mar 2018 13:26)      MEDICATIONS:  STANDING MEDICATIONS  aspirin enteric coated 81 milliGRAM(s) Oral daily  heparin  Injectable 5000 Unit(s) SubCutaneous every 8 hours  propranolol 40 milliGRAM(s) Oral daily  simvastatin 40 milliGRAM(s) Oral at bedtime  sodium chloride 0.9%. 1000 milliLiter(s) IV Continuous <Continuous>  ticagrelor 90 milliGRAM(s) Oral two times a day  valsartan 80 milliGRAM(s) Oral daily  verapamil  milliGRAM(s) Oral daily    PRN MEDICATIONS    VITALS:   T(F): 96.7  HR: 66  BP: 125/90  RR: 18  SpO2: 99%    LABS:                        15.4   7.21  )-----------( 176      ( 23 Mar 2018 06:25 )             43.9     03-23    141  |  103  |  25<H>  ----------------------------<  109  4.5   |  27  |  1.3    Ca    9.1      23 Mar 2018 06:25    TPro  6.8  /  Alb  4.7  /  TBili  0.6  /  DBili  x   /  AST  14  /  ALT  14  /  AlkPhos  57  03-23    PT/INR - ( 23 Mar 2018 06:25 )   PT: 10.50 sec;   INR: 0.97 ratio               Creatine Kinase, Serum: 87 U/L (03-23-18 @ 06:25)  Troponin T, Serum: <0.01 ng/mL (03-23-18 @ 06:25)      CARDIAC MARKERS ( 23 Mar 2018 06:25 )  x     / <0.01 ng/mL / 87 U/L / x     / 3.0 ng/mL      RADIOLOGY:  < from: CT Heart with Coronaries (03.23.18 @ 09:15) >  IMPRESSION:     Atherosclerotic disease within the LAD contributing up to severe stenosis   and an area of likely total occlusion at theorigin of the first diagonal   branch.      The total Agatston coronary artery calcium score equals 43, which   corresponds to 44th percentile for age, gender and ethnicity.     CAD-RADS 5.    < end of copied text >    < from: NM Nuclear Stress Pharmacologic Multiple (03.12.18 @ 18:02) >  Impression:   1. NORMAL ADENOSINE / REST MYOCARDIAL PERFUSION TOMOGRAPHY, WITH NO   EVIDENCE FOR ISCHEMIA DURING ADENOSINE INFUSION.   2. NORMAL RESTING LEFT VENTRICULAR WALL MOTION AND WALL THICKENING.  3. LEFT VENTRICULAR EJECTION FRACTION OF  71% WHICH IS WITHIN RANGE OF   NORMAL.       < end of copied text >    PHYSICAL EXAM:  GEN: No acute distress  HEENT: within normal range  LUNGS: Clear to auscultation bilaterally   HEART: S1/S2 present. RRR.   ABD: Soft, non-tender, non-distended. Bowel sounds present  EXT:  NEURO: AAOX3 LENGTH OF HOSPITAL STAY: day 1    CHIEF COMPLAINT:   Patient is a 62y old  Male who presents with a chief complaint of atypical chest pain with exertional sx (23 Mar 2018 11:34) noraml stress test but severe LAD stenosis on CTA    received pt from cath lab tonight  received CHRISTINA in LAD    HISTORY OF PRESENTING ILLNESS:    HPI:  63 y/o M with PMHx Afibx20 years (was on coumadin but was stopped "because he was doing well" about 10 years ago, HTN, DLD presenting for recurrent R sided chest and arm pain. Initially pt had sx 1 week ago when at rest he started experiencing R sided chest pain, described as pressure like, with radiation to the R arm. He denies any prior incident, the pain lasted for 15min. He sought medical attention at that time and had a nuclear stress test done, which was negative. Now pt is returning bc for past 3-4 days since discharge he is experiencing the same R chest and arm pain, coming and going, only when he exerts himself. He spoke with Dr Zhou, who told him to come back. Here CT Coronaries showed atherosclerotic disease within the LAD contributing up to to severe stenosis and an area of likely total occlusion at the origin of the first diagonal. (23 Mar 2018 11:34)    PAST MEDICAL & SURGICAL HISTORY  PAST MEDICAL & SURGICAL HISTORY:  Dyslipidemia  Hypertension  Atrial fibrillation  No significant past surgical history    SOCIAL HISTORY:  ex smoker quit 30 yrs ago  ALLERGIES:  No Known Allergies    Home Medications:  aspirin 325 mg oral tablet: 1 tab(s) orally once a day (23 Mar 2018 13:26)  propranolol 40 mg oral tablet: 1 tab(s) orally once a day (23 Mar 2018 13:26)  simvastatin 40 mg oral tablet: 1 tab(s) orally once a day (at bedtime) (23 Mar 2018 13:26)  valsartan 80 mg oral tablet: 1 tab(s) orally once a day (23 Mar 2018 13:26)  verapamil 240 mg/24 hours oral capsule, extended release: 1 cap(s) orally once a day (23 Mar 2018 13:26)      MEDICATIONS:  STANDING MEDICATIONS  aspirin enteric coated 81 milliGRAM(s) Oral daily  heparin  Injectable 5000 Unit(s) SubCutaneous every 8 hours  propranolol 40 milliGRAM(s) Oral daily  simvastatin 40 milliGRAM(s) Oral at bedtime  sodium chloride 0.9%. 1000 milliLiter(s) IV Continuous <Continuous>  ticagrelor 90 milliGRAM(s) Oral two times a day  valsartan 80 milliGRAM(s) Oral daily  verapamil  milliGRAM(s) Oral daily    PRN MEDICATIONS    VITALS:   T(F): 96.7  HR: 66  BP: 125/90  RR: 18  SpO2: 99%    LABS:                        15.4   7.21  )-----------( 176      ( 23 Mar 2018 06:25 )             43.9     03-23    141  |  103  |  25<H>  ----------------------------<  109  4.5   |  27  |  1.3    Ca    9.1      23 Mar 2018 06:25    TPro  6.8  /  Alb  4.7  /  TBili  0.6  /  DBili  x   /  AST  14  /  ALT  14  /  AlkPhos  57  03-23    PT/INR - ( 23 Mar 2018 06:25 )   PT: 10.50 sec;   INR: 0.97 ratio               Creatine Kinase, Serum: 87 U/L (03-23-18 @ 06:25)  Troponin T, Serum: <0.01 ng/mL (03-23-18 @ 06:25)      CARDIAC MARKERS ( 23 Mar 2018 06:25 )  x     / <0.01 ng/mL / 87 U/L / x     / 3.0 ng/mL      RADIOLOGY:  < from: CT Heart with Coronaries (03.23.18 @ 09:15) >  IMPRESSION:     Atherosclerotic disease within the LAD contributing up to severe stenosis   and an area of likely total occlusion at theorigin of the first diagonal   branch.      The total Agatston coronary artery calcium score equals 43, which   corresponds to 44th percentile for age, gender and ethnicity.     CAD-RADS 5.    < end of copied text >    < from: NM Nuclear Stress Pharmacologic Multiple (03.12.18 @ 18:02) >  Impression:   1. NORMAL ADENOSINE / REST MYOCARDIAL PERFUSION TOMOGRAPHY, WITH NO   EVIDENCE FOR ISCHEMIA DURING ADENOSINE INFUSION.   2. NORMAL RESTING LEFT VENTRICULAR WALL MOTION AND WALL THICKENING.  3. LEFT VENTRICULAR EJECTION FRACTION OF  71% WHICH IS WITHIN RANGE OF   NORMAL.       < end of copied text >    PHYSICAL EXAM:  GEN: No acute distress  HEENT: within normal range  LUNGS: Clear to auscultation bilaterally   HEART: S1/S2 present. RRR.   ABD: Soft, non-tender, non-distended. Bowel sounds present  EXT:no lE edema  vacum dressing at Right groin  NEURO: AAOX3

## 2018-03-23 NOTE — H&P ADULT - NSHPPHYSICALEXAM_GEN_ALL_CORE
General: well dressed, well spoken gentleman reclining in bed in NAD  Cardiac: no chest pain at time of interview, in sinus rhythm at the time, RRR, S1S2  Lungs: no wheezing, rales  Abd: NTND, +BS  LE: no edema  Neuro: no focal deficits, AAOX3

## 2018-03-23 NOTE — H&P ADULT - ATTENDING COMMENTS
Patient seen/examined independently on 3/24 7PM. Agree with above except as noted.     62-yo male presented with unstable angina.     1) Unstable Angina/CAD :  S/p CHRISTINA of subtotal mid LAD    c/w ASA/Brilinta  Per cardio, changed to Propranolol once a day form  check Echo    If no events, for D/c tomorrow.

## 2018-03-23 NOTE — ED ADULT NURSE REASSESSMENT NOTE - NS ED NURSE REASSESS COMMENT FT1
pt received, A&Ox3. Pt in no acute distress, breathing unlabored & equal. pt on continuous CM as ordered, VS stable. 18G to RAC flushing well; no redness or signs of infiltration noted to site. pt denies pain at this time, denies pain with resting, pt reports pain becomes present with movement/activity. pt awaiting further medical disposition at this time. safety measures maintained. call bell within reach. comfort measures provided.

## 2018-03-23 NOTE — CONSULT NOTE ADULT - ATTENDING COMMENTS
t seen in cardiac cath lab. Pt with atypical rt sided chest and arm pain about 10 days ago. At that time negative EKGs and troponins as well as normal 2D echo and adenosine nuclear  stress teast without ischemia. recently exertional rt sided discomfort typical of angina and pt came to ER. Normal exam. Negative troponin x1 and normal. EKG. Pt on verapamil and valsartan and propanolol and asa for htn and atrial arrhythmia followed by Dr. East. CTA today with high grade mid LAD stenosis and pt to have cardiac catheterization by Dr Amaury diashis afternoon.

## 2018-03-23 NOTE — H&P ADULT - NSHPSOCIALHISTORY_GEN_ALL_CORE
Quit smoking 30 years ago, used to smoke 1 pack/day for 8-9 years. Denies alcohol/IVDU. Pt semi retired and works as a , no exertional activity.

## 2018-03-23 NOTE — ED PROVIDER NOTE - MEDICAL DECISION MAKING DETAILS
Pt was signed out to me by Dr Morris awaiting cardiac enzymes. Enzymes negative, pt asymptomatic in the ED. CCTA revealed LAD stenosis; spoke with cardiology Dr Joy, will admit for cath.

## 2018-03-23 NOTE — H&P ADULT - HISTORY OF PRESENT ILLNESS
HISTORY OF PRESENT ILLNESS: 62yMale with PMH below presented to the hospital for right sided chest pain on exertion for the last few days. He had similar symptoms last week with a negative stress test by [facility] and had some similar symptoms now. When he is sitting down or lying down, it is not present but with some exertion, he has some symptoms in the right shoulder/ arm area. he does not notice it all the time but it happens occasionally. He spoke with Dr. clark last night about these symptoms and he told the patient to go to the ER for further testing. 63 y/o M with PMHx Afibx20 years (was on coumadin but was stopped "because he was doing well" about 10 years ago, HTN, DLD presenting for recurrent R sided chest and arm pain. Initially pt had sx 1 week ago when at rest he started experiencing R sided chest pain, described as pressure like, with radiation to the R arm. He denies any prior incident, the pain lasted for 15min. He sought medical attention at that time and had a nuclear stress test done, which was negative. Now pt is returning bc for past 3-4 days since discharge he is experiencing the same R chest and arm pain, coming and going, only when he exerts himself. He spoke with Dr Zhou, who told him to come back. Here CT Coronaries showed atherosclerotic disease within the LAD contributing up to to severe stenosis and an area of likely total occlusion at the origin of the first diagonal.

## 2018-03-23 NOTE — CONSULT NOTE ADULT - ASSESSMENT
Atypical chest pain with exertional symptoms  - had negative nuclear stress test one week ago by [facility] but had some ST depressions on EKG  - echo had previously shown normal systolic function with grade one diastolic dysfunction  - will perform CCTA in ER, if intermediate or negative, may D/C home  - if critical findings will do cardiac catheterization    DLD  - continue with pravastatin, lifestyle modifications

## 2018-03-23 NOTE — H&P ADULT - NSHPLABSRESULTS_GEN_ALL_CORE
CARDIAC MARKERS ( 23 Mar 2018 06:25 )  x     / <0.01 ng/mL / 87 U/L / x     / 3.0 ng/mL                        15.4   7.21  )-----------( 176      ( 23 Mar 2018 06:25 )             43.9   03-23  141  |  103  |  25<H>  ----------------------------<  109  4.5   |  27  |  1.3  Ca    9.1      23 Mar 2018 06:25  TPro  6.8  /  Alb  4.7  /  TBili  0.6  /  DBili  x   /  AST  14  /  ALT  14  /  AlkPhos  57  03-23  LIVER FUNCTIONS - ( 23 Mar 2018 06:25 )  Alb: 4.7 g/dL / Pro: 6.8 g/dL / ALK PHOS: 57 U/L / ALT: 14 U/L / AST: 14 U/L / GGT: x        PT/INR - ( 23 Mar 2018 06:25 )   PT: 10.50 sec;   INR: 0.97 ratio

## 2018-03-23 NOTE — H&P ADULT - ASSESSMENT
Atypical chest pain with exertional symptoms  - had negative nuclear stress test one week ago by [facility] but had some ST depressions on EKG  - echo had previously shown normal systolic function with grade one diastolic dysfunction  - will perform CCTA in ER, if intermediate or negative, may D/C home  - if critical findings will do cardiac catheterization    DLD  - continue with pravastatin, lifestyle modifications 63 y/o M with PMHx Afibx20 years, HTN, DLD presenting for recurrent R sided chest and arm pain.    1. Atypical Chest pain with exertional sx, r/o ischemia:  -negative nuclear stress but positive CT coronary  -pt will be going up for cardiac cath today   -will trend CE, pt already received his full dose ASA, will get plavix on the table  -please check stat EKG if pt c/o any chest pain, follow up cath report  -repeat echo, last echo 6/2012 showed EF 5-65%, mild MR, LA+RA dilated  -check A1c, lipid panel  2. HTN: c/w verapamil, propranolol, valsartan  3. DLD: c/w simvastatin  4. DVT: heparin 5000q8  5. Afib: HR control with propranolol, not on any A/C, only on  mg daily 63 y/o M with PMHx Afibx20 years, HTN, DLD presenting for recurrent R sided chest and arm pain.    1. Atypical Chest pain with exertional sx, r/o ischemia:  -negative nuclear stress but positive CT coronary  -pt will be going up for cardiac cath today   -will trend CE, pt already received his full dose ASA, will get plavix on the table  -please check stat EKG if pt c/o any chest pain, follow up cath report  -no need to repeat echo, last echo 3/2018 showed EF 60-65, G1DD  -check A1c, lipid panel  2. HTN: c/w verapamil, propranolol, valsartan  3. DLD: c/w simvastatin  4. DVT: heparin 5000q8  5. Afib: HR control with propranolol, not on any A/C, only on  mg daily

## 2018-03-23 NOTE — ED PROVIDER NOTE - PROGRESS NOTE DETAILS
pt signed out to Dr. Sauceda to continue care. Pt asymptomatic now, well appearing. HR 60s, labs ok with normal cardiac enzymes. Will speak with radiology re CCTA in the setting of afib. Spoke with Dr Muñoz, HR now 62, rec trying for CCTA as it will save pt a cath if negative. Pt amenable. Pt with severe stenosis of mid LAD per CCTA. Spoke with cardiology fellow Dr Joy, will admit for cath. Pt aware of need for admission and is amenable.

## 2018-03-23 NOTE — ED PROVIDER NOTE - OBJECTIVE STATEMENT
61 y/o M here with continued R sided CP with rads to his right arm that is the same as when he was admitted last week for the same pain. Worse with exertion. no n/v/d. no ap.  no sob. no fever/cough. no trauma.  Pt had a neg Adenosine nuc stress on 3/12 and was d/c home.  Pt spoke with Dr. Zhou his cardiologist last evening and Dr. zhou said to go to the ER and get set up for a CCTA if he was to keep having the pain.  PMH htn, hchol, afib.

## 2018-03-23 NOTE — H&P ADULT - FAMILY HISTORY
Father  Still living? No  Family history of coronary artery disease, Age at diagnosis: Age Unknown     Uncle  Still living? Unknown  Family history of coronary artery disease, Age at diagnosis: Age Unknown     Sibling  Still living? Unknown  Family history of coronary artery disease in sister, Age at diagnosis: Age Unknown

## 2018-03-23 NOTE — ED PROVIDER NOTE - PHYSICAL EXAMINATION
Physical Examination:   VITAL SIGNS: I have reviewed vital signs.  CONSTITUTIONAL: well appearing, NAD.   SKIN: Skin exam is warm and dry.  No rashes  HEAD: Normocephalic; atraumatic.  EYES: PERRL, EOM intact; conjunctiva and sclera clear.  ENT: No nasal discharge; airway clear.  CARD: S1, S2 reg.  No chest wall ttp.   RESP: CTAB. No wheezes, rales or rhonchi.  ABD: soft; non-distended; non-tender  EXT: Normal ROM. No edema. No calf ttp.   NEURO: Alert, oriented. Grossly unremarkable. No focal deficits. Ambulatory with steady gait.  PSYCH: Cooperative, appropriate.

## 2018-03-23 NOTE — ED PROVIDER NOTE - NS ED ROS FT
Review of Systems:  · CONSTITUTIONAL: no fever and no chills.  · EYES: no discharge, no irritation, no pain, no redness, and no visual changes.  · ENMT: Ears: no ear pain and no hearing problems. Nose: no nasal congestion and no nasal drainage. Mouth/Throat: no dysphagia, no hoarseness and no throat pain.  · CARDIOVASCULAR: + chest pain  · RESPIRATORY: no cough, and no shortness of breath.  · GASTROINTESTINAL: no abdominal pain, no diarrhea, no nausea and no vomiting.  · GENITOURINARY: no dysuria  · MUSCULOSKELETAL: no back pain, no weakness.  · SKIN: no rashes.  · NEURO: no loss of consciousness, no headache.  · ROS STATEMENT: all other ROS negative except as per HPI

## 2018-03-23 NOTE — PROGRESS NOTE ADULT - ASSESSMENT
63 y/o M with PMHx Afibx20 years, HTN, DLD presenting for recurrent R sided chest and arm pain.    1. Atypical Chest pain with exertional sx, r/o ischemia:  -negative nuclear stress but positive CT coronary  -s/p stent in LAD   -check A1c, lipid panel  -complicated by femoral oosing, stitches applied by DR FIELD  admitted to CCU for monitoring over night  -follow cardiac recs for further care        2. HTN: c/w verapamil, propranolol, valsartan  3. DLD: c/w simvastatin  4. DVT: heparin 5000q8  5. Afib: HR control with propranolol, not on any A/C, only on  mg daily 61 y/o M with PMHx Afibx20 years, HTN, DLD presenting for recurrent R sided chest and arm pain.    1. Atypical Chest pain with exertional sx, r/o ischemia:  -negative nuclear stress but positive CT coronary  -s/p stent in LAD   -check A1c, lipid panel  -complicated by femoral oosing, stitches applied by DR FIELD  -watch s/s for bleeding,  follow cbc at am  admitted to CCU for monitoring over night  -follow cardiac recs for further care        2. HTN: c/w verapamil, propranolol, valsartan  3. DLD: c/w simvastatin  4. DVT: heparin 5000q8  5. Afib: HR control with propranolol, not on any A/C, only on  mg daily

## 2018-03-23 NOTE — H&P ADULT - NSHPREVIEWOFSYSTEMS_GEN_ALL_CORE
General: no recent illness, baseline exercise activity is jogging 3 miles/day  Cardiac: +R sided chest and arm pain on minimal exertion, General: no recent illness, baseline exercise activity is jogging 3 miles/day  Cardiac: +R sided chest and arm pain on minimal exertion, denies palpitations, endorsed some shortness of breath on ambulance coming here but no diaphoresis or nausea during episodes of pain  Lungs: no coughing, wheezing  Abd: no nausea, vomiting, diarrhea  LE: no swelling  Neuro: no focal deficits

## 2018-03-23 NOTE — CONSULT NOTE ADULT - SUBJECTIVE AND OBJECTIVE BOX
Date of Admission: 3/23    CHIEF COMPLAINT: chest pain    HISTORY OF PRESENT ILLNESS: 62yMale with PMH below presented to the hospital for right sided chest pain on exertion for the last few days. He had similar symptoms last week with a negative stress test by [facility] and had some similar symptoms now. When he is sitting down or lying down, it is not present but with some exertion, he has some symptoms in the right shoulder/ arm area. he does not notice it all the time but it happens occasionally. He spoke with Dr. clark last night about these symptoms and he told the patient to go to the ER for further testing.     PAST MEDICAL & SURGICAL HISTORY:  Dyslipidemia  Hypertension  Atrial fibrillation  No significant past surgical history    HEALTH ISSUES - PROBLEM Dx:        FAMILY HISTORY:  Family history of coronary artery disease (Father, Uncle)    Allergies    No Known Allergies    Intolerances    	  Home Medications:  aspirin 325 mg oral tablet: 1 tab(s) orally once a day (12 Mar 2018 02:15)  atorvastatin 20 mg oral tablet: 1 tab(s) orally once a day (12 Mar 2018 02:15)  propranolol 40 mg oral tablet: 1 tab(s) orally once a day (12 Mar 2018 02:15)  valsartan 80 mg oral tablet: 1 tab(s) orally once a day (12 Mar 2018 02:15)  verapamil 240 mg/24 hours oral capsule, extended release: 1 cap(s) orally once a day (12 Mar 2018 02:15)    MEDICATIONS  (STANDING):    MEDICATIONS  (PRN):              SOCIAL HISTORY:    [ ] Non-smoker  [ ] Smoker  [ ] Alcohol      REVIEW OF SYSTEMS:  CONSTITUTIONAL: No fever, weight loss, or fatigue  CARDIOLOGY: PAtient denies chest pain, shortness of breath or syncopal episodes.   RESPIRATORY: denies shortness of breath, wheezeing.   NEUROLOGICAL: NO weakness, no focal deficits to report.  ENDOCRINOLOGICAL: no recent change in diabetic medications.   GI: no BRBPR, no N,V,diarrhea.    PSYCHIATRY: normal mood and affect  HEENT: no nasal discharge, no ecchymosis  SKIN: no ecchymosis, no breakdown  MUSCULOSKELETAL: Full range of motion x4.      PHYSICAL EXAM:  T(C): 35.9 (03-23-18 @ 05:55), Max: 35.9 (03-23-18 @ 05:55)  HR: 68 (03-23-18 @ 05:55) (68 - 68)  BP: 166/81 (03-23-18 @ 05:55) (166/81 - 166/81)  RR: 18 (03-23-18 @ 05:55) (18 - 18)  SpO2: 100% (03-23-18 @ 05:55) (100% - 100%)  Wt(kg): --  I&O's Summary    Daily     Daily     General Appearance: Normal	  Cardiovascular: Normal S1 S2, No JVD, No murmurs, No edema  Respiratory: Lungs clear to auscultation	  Psychiatry: A & O x 3, Mood & affect appropriate  Gastrointestinal:  Soft, Non-tender  Skin: No rashes, No ecchymoses, No cyanosis	  Neurologic: Non-focal  Extremities: Normal range of motion, No clubbing, cyanosis or edema  Vascular: Peripheral pulses palpable 2+ bilaterally        LABS:	 	                          15.4   7.21  )-----------( 176      ( 23 Mar 2018 06:25 )             43.9     03-23    141  |  103  |  25<H>  ----------------------------<  109  4.5   |  27  |  1.3    Ca    9.1      23 Mar 2018 06:25    TPro  6.8  /  Alb  4.7  /  TBili  0.6  /  DBili  x   /  AST  14  /  ALT  14  /  AlkPhos  57  03-23    CARDIAC MARKERS ( 23 Mar 2018 06:25 )  x     / <0.01 ng/mL / 87 U/L / x     / 3.0 ng/mL      PT/INR - ( 23 Mar 2018 06:25 )   PT: 10.50 sec;   INR: 0.97 ratio             proBNP:   Lipid Profile:   HgA1c:   TSH:       CARDIAC MARKERS:            TELEMETRY EVENTS: sinus with PACs on the monitor  ECG:  	sinus bradycardia. otherwise normal  RADIOLOGY:  OTHER: 	    PREVIOUS DIAGNOSTIC TESTING:    [x ] Echocardiogram: < from: Transthoracic Echocardiogram (03.12.18 @ 07:26) >  Summary:   1. Left ventricular ejection fraction, by visual estimation, is 60 to   65%.   2. Normal global left ventricular systolic function.   3. Spectral Doppler shows impaired relaxation pattern of left   ventricular myocardial filling (Grade I diastolic dysfunction).    < end of copied text >    [ ]  Catheterization:  [x ] Stress Test:  < from: NM Nuclear Stress Pharmacologic Multiple (03.12.18 @ 18:02) >  1. NORMAL ADENOSINE / REST MYOCARDIAL PERFUSION TOMOGRAPHY, WITH NO   EVIDENCE FOR ISCHEMIA DURING ADENOSINE INFUSION.   2. NORMAL RESTING LEFT VENTRICULAR WALL MOTION AND WALL THICKENING.  3. LEFT VENTRICULAR EJECTION FRACTION OF  71% WHICH IS WITHIN RANGE OF   NORMAL.    < end of copied text >  	  	  ASSESSMENT/PLAN:

## 2018-03-24 LAB
ANION GAP SERPL CALC-SCNC: 11 MMOL/L — SIGNIFICANT CHANGE UP (ref 7–14)
BASOPHILS # BLD AUTO: 0.05 K/UL — SIGNIFICANT CHANGE UP (ref 0–0.2)
BASOPHILS # BLD AUTO: 0.06 K/UL — SIGNIFICANT CHANGE UP (ref 0–0.2)
BASOPHILS NFR BLD AUTO: 0.6 % — SIGNIFICANT CHANGE UP (ref 0–1)
BASOPHILS NFR BLD AUTO: 0.7 % — SIGNIFICANT CHANGE UP (ref 0–1)
BUN SERPL-MCNC: 18 MG/DL — SIGNIFICANT CHANGE UP (ref 10–20)
CALCIUM SERPL-MCNC: 8.5 MG/DL — SIGNIFICANT CHANGE UP (ref 8.5–10.1)
CHLORIDE SERPL-SCNC: 100 MMOL/L — SIGNIFICANT CHANGE UP (ref 98–110)
CHOLEST SERPL-MCNC: 161 MG/DL — SIGNIFICANT CHANGE UP (ref 100–200)
CK MB CFR SERPL CALC: 4.6 NG/ML — SIGNIFICANT CHANGE UP (ref 0.6–6.3)
CK SERPL-CCNC: 89 U/L — SIGNIFICANT CHANGE UP (ref 0–225)
CO2 SERPL-SCNC: 26 MMOL/L — SIGNIFICANT CHANGE UP (ref 17–32)
CREAT SERPL-MCNC: 1 MG/DL — SIGNIFICANT CHANGE UP (ref 0.7–1.5)
EOSINOPHIL # BLD AUTO: 0.06 K/UL — SIGNIFICANT CHANGE UP (ref 0–0.7)
EOSINOPHIL # BLD AUTO: 0.09 K/UL — SIGNIFICANT CHANGE UP (ref 0–0.7)
EOSINOPHIL NFR BLD AUTO: 0.7 % — SIGNIFICANT CHANGE UP (ref 0–8)
EOSINOPHIL NFR BLD AUTO: 1.1 % — SIGNIFICANT CHANGE UP (ref 0–8)
ESTIMATED AVERAGE GLUCOSE: 108 MG/DL — SIGNIFICANT CHANGE UP (ref 68–114)
GLUCOSE SERPL-MCNC: 99 MG/DL — SIGNIFICANT CHANGE UP (ref 70–99)
HBA1C BLD-MCNC: 5.4 % — SIGNIFICANT CHANGE UP (ref 4–5.6)
HCT VFR BLD CALC: 39.2 % — LOW (ref 42–52)
HCT VFR BLD CALC: 40.4 % — LOW (ref 42–52)
HDLC SERPL-MCNC: 29 MG/DL — LOW (ref 40–125)
HGB BLD-MCNC: 13.5 G/DL — LOW (ref 14–18)
HGB BLD-MCNC: 13.7 G/DL — LOW (ref 14–18)
IMM GRANULOCYTES NFR BLD AUTO: 0.4 % — HIGH (ref 0.1–0.3)
IMM GRANULOCYTES NFR BLD AUTO: 0.4 % — HIGH (ref 0.1–0.3)
LIPID PNL WITH DIRECT LDL SERPL: 108 MG/DL — SIGNIFICANT CHANGE UP (ref 4–129)
LYMPHOCYTES # BLD AUTO: 1.27 K/UL — SIGNIFICANT CHANGE UP (ref 1.2–3.4)
LYMPHOCYTES # BLD AUTO: 1.7 K/UL — SIGNIFICANT CHANGE UP (ref 1.2–3.4)
LYMPHOCYTES # BLD AUTO: 14 % — LOW (ref 20.5–51.1)
LYMPHOCYTES # BLD AUTO: 20.2 % — LOW (ref 20.5–51.1)
MAGNESIUM SERPL-MCNC: 2 MG/DL — SIGNIFICANT CHANGE UP (ref 1.8–2.4)
MCHC RBC-ENTMCNC: 28.1 PG — SIGNIFICANT CHANGE UP (ref 27–31)
MCHC RBC-ENTMCNC: 28.4 PG — SIGNIFICANT CHANGE UP (ref 27–31)
MCHC RBC-ENTMCNC: 33.9 G/DL — SIGNIFICANT CHANGE UP (ref 32–37)
MCHC RBC-ENTMCNC: 34.4 G/DL — SIGNIFICANT CHANGE UP (ref 32–37)
MCV RBC AUTO: 82.5 FL — SIGNIFICANT CHANGE UP (ref 80–94)
MCV RBC AUTO: 82.8 FL — SIGNIFICANT CHANGE UP (ref 80–94)
MONOCYTES # BLD AUTO: 0.59 K/UL — SIGNIFICANT CHANGE UP (ref 0.1–0.6)
MONOCYTES # BLD AUTO: 0.82 K/UL — HIGH (ref 0.1–0.6)
MONOCYTES NFR BLD AUTO: 6.5 % — SIGNIFICANT CHANGE UP (ref 1.7–9.3)
MONOCYTES NFR BLD AUTO: 9.7 % — HIGH (ref 1.7–9.3)
NEUTROPHILS # BLD AUTO: 5.73 K/UL — SIGNIFICANT CHANGE UP (ref 1.4–6.5)
NEUTROPHILS # BLD AUTO: 7.06 K/UL — HIGH (ref 1.4–6.5)
NEUTROPHILS NFR BLD AUTO: 67.9 % — SIGNIFICANT CHANGE UP (ref 42.2–75.2)
NEUTROPHILS NFR BLD AUTO: 77.8 % — HIGH (ref 42.2–75.2)
NRBC # BLD: 0 /100 WBCS — SIGNIFICANT CHANGE UP (ref 0–0)
PLATELET # BLD AUTO: 155 K/UL — SIGNIFICANT CHANGE UP (ref 130–400)
PLATELET # BLD AUTO: 180 K/UL — SIGNIFICANT CHANGE UP (ref 130–400)
POTASSIUM SERPL-MCNC: 3.9 MMOL/L — SIGNIFICANT CHANGE UP (ref 3.5–5)
POTASSIUM SERPL-SCNC: 3.9 MMOL/L — SIGNIFICANT CHANGE UP (ref 3.5–5)
RBC # BLD: 4.75 M/UL — SIGNIFICANT CHANGE UP (ref 4.7–6.1)
RBC # BLD: 4.88 M/UL — SIGNIFICANT CHANGE UP (ref 4.7–6.1)
RBC # FLD: 12.2 % — SIGNIFICANT CHANGE UP (ref 11.5–14.5)
RBC # FLD: 12.5 % — SIGNIFICANT CHANGE UP (ref 11.5–14.5)
SODIUM SERPL-SCNC: 137 MMOL/L — SIGNIFICANT CHANGE UP (ref 135–146)
TOTAL CHOLESTEROL/HDL RATIO MEASUREMENT: 5.6 RATIO — HIGH (ref 4–5.5)
TRIGL SERPL-MCNC: 207 MG/DL — HIGH (ref 10–149)
TROPONIN T SERPL-MCNC: 0.03 NG/ML — HIGH
WBC # BLD: 8.43 K/UL — SIGNIFICANT CHANGE UP (ref 4.8–10.8)
WBC # BLD: 9.07 K/UL — SIGNIFICANT CHANGE UP (ref 4.8–10.8)
WBC # FLD AUTO: 8.43 K/UL — SIGNIFICANT CHANGE UP (ref 4.8–10.8)
WBC # FLD AUTO: 9.07 K/UL — SIGNIFICANT CHANGE UP (ref 4.8–10.8)

## 2018-03-24 RX ORDER — VALSARTAN 80 MG/1
80 TABLET ORAL ONCE
Qty: 0 | Refills: 0 | Status: COMPLETED | OUTPATIENT
Start: 2018-03-24 | End: 2018-03-24

## 2018-03-24 RX ORDER — ACETAMINOPHEN 500 MG
650 TABLET ORAL ONCE
Qty: 0 | Refills: 0 | Status: COMPLETED | OUTPATIENT
Start: 2018-03-24 | End: 2018-03-24

## 2018-03-24 RX ADMIN — HEPARIN SODIUM 5000 UNIT(S): 5000 INJECTION INTRAVENOUS; SUBCUTANEOUS at 14:27

## 2018-03-24 RX ADMIN — Medication 240 MILLIGRAM(S): at 12:07

## 2018-03-24 RX ADMIN — Medication 650 MILLIGRAM(S): at 17:40

## 2018-03-24 RX ADMIN — Medication 81 MILLIGRAM(S): at 12:07

## 2018-03-24 RX ADMIN — TICAGRELOR 90 MILLIGRAM(S): 90 TABLET ORAL at 17:16

## 2018-03-24 RX ADMIN — VALSARTAN 80 MILLIGRAM(S): 80 TABLET ORAL at 17:18

## 2018-03-24 RX ADMIN — SIMVASTATIN 40 MILLIGRAM(S): 20 TABLET, FILM COATED ORAL at 22:41

## 2018-03-24 RX ADMIN — Medication 40 MILLIGRAM(S): at 12:07

## 2018-03-24 RX ADMIN — TICAGRELOR 90 MILLIGRAM(S): 90 TABLET ORAL at 05:53

## 2018-03-24 RX ADMIN — Medication 650 MILLIGRAM(S): at 16:40

## 2018-03-24 RX ADMIN — HEPARIN SODIUM 5000 UNIT(S): 5000 INJECTION INTRAVENOUS; SUBCUTANEOUS at 05:53

## 2018-03-24 NOTE — PROGRESS NOTE ADULT - SUBJECTIVE AND OBJECTIVE BOX
SUBJECTIVE:    Patient is a 62y old Male who presents with a chief complaint of atypical chest pain with exertional sx (23 Mar 2018 11:34)    Currently admitted to medicine with the primary diagnosis of Chest pain     Today is hospital day 1d. This morning he is resting comfortably in bed and reports no new issues or overnight events. No chest pain, SOB, numbness, tingling, dizziness, lightheadedness. No hematoma noted on R groin evaluated with cardio fellow at bedside.    PAST MEDICAL & SURGICAL HISTORY  Dyslipidemia  Hypertension  Atrial fibrillation  No significant past surgical history    SOCIAL HISTORY:  Negative for smoking/alcohol/drug use.     ALLERGIES:  No Known Allergies    MEDICATIONS:  STANDING MEDICATIONS  aspirin enteric coated 81 milliGRAM(s) Oral daily  heparin  Injectable 5000 Unit(s) SubCutaneous every 8 hours  propranolol 40 milliGRAM(s) Oral daily  simvastatin 40 milliGRAM(s) Oral at bedtime  sodium chloride 0.9%. 1000 milliLiter(s) IV Continuous <Continuous>  ticagrelor 90 milliGRAM(s) Oral two times a day  valsartan 80 milliGRAM(s) Oral daily  verapamil  milliGRAM(s) Oral daily    PRN MEDICATIONS    VITALS:   T(F): 97.9  HR: 64  BP: 148/74  RR: 13  SpO2: 97%    LABS:                        13.7   8.43  )-----------( 180      ( 24 Mar 2018 04:21 )             40.4     03-24    137  |  100  |  18  ----------------------------<  99  3.9   |  26  |  1.0    Ca    8.5      24 Mar 2018 04:21  Mg     2.0     03-24    TPro  6.8  /  Alb  4.7  /  TBili  0.6  /  DBili  x   /  AST  14  /  ALT  14  /  AlkPhos  57  03-23    PT/INR - ( 23 Mar 2018 06:25 )   PT: 10.50 sec;   INR: 0.97 ratio        Creatine Kinase, Serum: 89 U/L (03-24-18 @ 04:21)  Troponin T, Serum: 0.03 ng/mL <H> (03-24-18 @ 04:21)      CARDIAC MARKERS ( 24 Mar 2018 04:21 )  x     / 0.03 ng/mL / 89 U/L / x     / 4.6 ng/mL  CARDIAC MARKERS ( 23 Mar 2018 06:25 )  x     / <0.01 ng/mL / 87 U/L / x     / 3.0 ng/mL      RADIOLOGY:  < from: CT Heart with Coronaries (03.23.18 @ 09:15) >    Atherosclerotic disease within the LAD contributing up to severe stenosis   and an area of likely total occlusion at theorigin of the first diagonal   branch.      The total Agatston coronary artery calcium score equals 43, which   corresponds to 44th percentile for age, gender and ethnicity.     CAD-RADS 5.    < from: Xray Chest 2 Views PA/Lat (03.23.18 @ 06:40) >  No radiographic evidence of acute cardiopulmonary disease.    < end of copied text >      PHYSICAL EXAM:  GEN: No acute distress  HEENT: NC/AT   LUNGS: Clear to auscultation bilaterally   HEART: S1/S2 present. RRR.   ABD: Soft, non-tender, non-distended. Bowel sounds present  EXT: no lower extremity edema, no r groin hematoma  NEURO: AAOX3 SUBJECTIVE:    Patient is a 62y old Male who presents with a chief complaint of atypical chest pain with exertional sx (23 Mar 2018 11:34)    Currently admitted to medicine with the primary diagnosis of Chest pain     Today is hospital day 1d. This morning he is resting comfortably in bed and reports no new issues or overnight events. No chest pain, SOB, numbness, tingling, dizziness, lightheadedness. No hematoma noted on R groin evaluated with cardio fellow at bedside.    PAST MEDICAL & SURGICAL HISTORY  Dyslipidemia  Hypertension  Atrial fibrillation  No significant past surgical history    SOCIAL HISTORY:  Negative for smoking/alcohol/drug use.     ALLERGIES:  No Known Allergies    MEDICATIONS:  STANDING MEDICATIONS  aspirin enteric coated 81 milliGRAM(s) Oral daily  heparin  Injectable 5000 Unit(s) SubCutaneous every 8 hours  propranolol 40 milliGRAM(s) Oral daily  simvastatin 40 milliGRAM(s) Oral at bedtime  sodium chloride 0.9%. 1000 milliLiter(s) IV Continuous <Continuous>  ticagrelor 90 milliGRAM(s) Oral two times a day  valsartan 80 milliGRAM(s) Oral daily  verapamil  milliGRAM(s) Oral daily    PRN MEDICATIONS    VITALS:   T(F): 97.9  HR: 64  BP: 148/74  RR: 13  SpO2: 97%    LABS:                        13.7   8.43  )-----------( 180      ( 24 Mar 2018 04:21 )             40.4     03-24    137  |  100  |  18  ----------------------------<  99  3.9   |  26  |  1.0    Ca    8.5      24 Mar 2018 04:21  Mg     2.0     03-24    Lipid Profile in AM (03.24.18 @ 04:21)    Total Cholesterol/HDL Ratio Measurement: 5.6 Ratio    Cholesterol, Serum: 161 mg/dL    Triglycerides, Serum: 207 mg/dL    HDL Cholesterol, Serum: 29 mg/dL    Direct LDL: 108: LDL Cholesterol --- Interpretive Comment (for adults 18 and over)    Hemoglobin A1C with Mean Plasma Glucose (03.24.18 @ 04:21)    Hemoglobin A1C, Whole Blood: 5.4 %    Mean Plasma Glucose: 108 mg/dL      TPro  6.8  /  Alb  4.7  /  TBili  0.6  /  DBili  x   /  AST  14  /  ALT  14  /  AlkPhos  57  03-23    PT/INR - ( 23 Mar 2018 06:25 )   PT: 10.50 sec;   INR: 0.97 ratio        Creatine Kinase, Serum: 89 U/L (03-24-18 @ 04:21)  Troponin T, Serum: 0.03 ng/mL <H> (03-24-18 @ 04:21)      CARDIAC MARKERS ( 24 Mar 2018 04:21 )  x     / 0.03 ng/mL / 89 U/L / x     / 4.6 ng/mL  CARDIAC MARKERS ( 23 Mar 2018 06:25 )  x     / <0.01 ng/mL / 87 U/L / x     / 3.0 ng/mL      RADIOLOGY:  < from: CT Heart with Coronaries (03.23.18 @ 09:15) >    Atherosclerotic disease within the LAD contributing up to severe stenosis   and an area of likely total occlusion at theorigin of the first diagonal   branch.      The total Agatston coronary artery calcium score equals 43, which   corresponds to 44th percentile for age, gender and ethnicity.     CAD-RADS 5.    < from: Xray Chest 2 Views PA/Lat (03.23.18 @ 06:40) >  No radiographic evidence of acute cardiopulmonary disease.    < end of copied text >      PHYSICAL EXAM:  GEN: No acute distress  HEENT: NC/AT   LUNGS: Clear to auscultation bilaterally   HEART: S1/S2 present. RRR.   ABD: Soft, non-tender, non-distended. Bowel sounds present  EXT: no lower extremity edema, no r groin hematoma  NEURO: AAOX3

## 2018-03-24 NOTE — PROGRESS NOTE ADULT - ASSESSMENT
61 y/o M with PMHx Afibx20 years, HTN, DLD presenting for recurrent R sided chest and arm pain.    1. Atypical Chest pain with exertional sx, r/o ischemia:  - negative nuclear stress but positive CT coronary  - s/p stent in LAD   - check A1c, lipid panel  - complicated by femoral oozing, stitches applied by DR FIELD; pressure dressing on overnnight  - no bleed to R groin; CBC stable  - follow cardiac recs for further care    2. HTN: c/w verapamil, propranolol, valsartan  3. DLD: c/w simvastatin  4. DVT: heparin 5000q8  5. Afib: HR control with propranolol, not on any A/C, only on  mg daily 61 y/o M with PMHx Afibx20 years, HTN, DLD presenting for recurrent R sided chest and arm pain.    1. Atypical Chest pain with exertional sx, r/o ischemia:  - negative nuclear stress but positive CT coronary  - s/p stent in LAD   - A1c & lipid panel done  - complicated by femoral oozing, stitches applied by DR FIELD; pressure dressing on overnight  - no bleed to R groin today; CBC stable  - follow cardiac recs for further care    2. HTN: c/w verapamil, propranolol, valsartan  3. DLD: c/w simvastatin  4. DVT: heparin 5000q8  5. Afib: HR control with propranolol, not on any A/C, only on  mg daily

## 2018-03-24 NOTE — PROGRESS NOTE ADULT - SUBJECTIVE AND OBJECTIVE BOX
SUBJ:  62-yo male presented with unstable angina. S/p CHRISTINA of subtotal mid LAD. No CP since then.    MEDICATIONS  (STANDING):  aspirin enteric coated 81 milliGRAM(s) Oral daily  heparin  Injectable 5000 Unit(s) SubCutaneous every 8 hours  propranolol 40 milliGRAM(s) Oral daily  simvastatin 40 milliGRAM(s) Oral at bedtime  sodium chloride 0.9%. 1000 milliLiter(s) (75 mL/Hr) IV Continuous <Continuous>  ticagrelor 90 milliGRAM(s) Oral two times a day  valsartan 80 milliGRAM(s) Oral daily  verapamil  milliGRAM(s) Oral daily    MEDICATIONS  (PRN):            Vital Signs Last 24 Hrs  T(C): 36.6 (24 Mar 2018 08:00), Max: 36.9 (24 Mar 2018 00:00)  T(F): 97.9 (24 Mar 2018 08:00), Max: 98.4 (24 Mar 2018 00:00)  HR: 64 (24 Mar 2018 10:00) (54 - 78)  BP: 148/74 (24 Mar 2018 10:00) (95/67 - 148/74)  BP(mean): 93 (24 Mar 2018 05:00) (74 - 93)  RR: 13 (24 Mar 2018 10:00) (9 - 46)  SpO2: 97% (24 Mar 2018 10:00) (94% - 100%)    REVIEW OF SYSTEMS:  CONSTITUTIONAL: No fever, weight loss, or fatigue  Patient denies chest pain, shortness of breath or syncopal episodes.       PHYSICAL EXAM:  · CONSTITUTIONAL:	Well-developed, well nourished,   BMI-  · RESPIRATORY:   breath sounds equal; good air movement; respirations non-labored; clear to auscultation bilaterally; no rales, rhonchi or wheezing  · CARDIOVASCULAR	regular rate and rhythm,  no rub,  no murmur, no gallop  · EXTREMITIES: No cyanosis, clubbing or edema, medium-sized ecchymosis, no hematoma  · VASCULAR: 	Equal and normal pulses (carotid, femoral, dorsalis pedis)  	  TELEMETRY: SR    ECG: SR, NSST changes    LABS:                        13.5   9.07  )-----------( 155      ( 24 Mar 2018 10:07 )             39.2     03-24    137  |  100  |  18  ----------------------------<  99  3.9   |  26  |  1.0    Ca    8.5      24 Mar 2018 04:21  Mg     2.0     03-24    TPro  6.8  /  Alb  4.7  /  TBili  0.6  /  DBili  x   /  AST  14  /  ALT  14  /  AlkPhos  57  03-23    CARDIAC MARKERS ( 24 Mar 2018 04:21 )  x     / 0.03 ng/mL / 89 U/L / x     / 4.6 ng/mL  CARDIAC MARKERS ( 23 Mar 2018 06:25 )  x     / <0.01 ng/mL / 87 U/L / x     / 3.0 ng/mL      PT/INR - ( 23 Mar 2018 06:25 )   PT: 10.50 sec;   INR: 0.97 ratio             I&O's Summary    23 Mar 2018 07:01  -  24 Mar 2018 07:00  --------------------------------------------------------  IN: 675 mL / OUT: 750 mL / NET: -75 mL    24 Mar 2018 07:01  -  24 Mar 2018 11:53  --------------------------------------------------------  IN: 180 mL / OUT: 450 mL / NET: -270 mL      BNP  RADIOLOGY & ADDITIONAL STUDIES:    IMPRESSION AND PLAN:  Unstable angina. S/p CHRISTINA to mid LAD.  Apical hypokinesis prior to PCI.    Plan:  Continue ASA/Brilinta.  Change Propranolol to long-acting (once a day) form.  2D ECHO today.  OOB to chair, ambulate.  D/c home in 24h if stable.

## 2018-03-25 ENCOUNTER — TRANSCRIPTION ENCOUNTER (OUTPATIENT)
Age: 63
End: 2018-03-25

## 2018-03-25 VITALS
RESPIRATION RATE: 16 BRPM | DIASTOLIC BLOOD PRESSURE: 88 MMHG | OXYGEN SATURATION: 100 % | HEART RATE: 98 BPM | SYSTOLIC BLOOD PRESSURE: 121 MMHG

## 2018-03-25 LAB
ALBUMIN SERPL ELPH-MCNC: 4 G/DL — SIGNIFICANT CHANGE UP (ref 3.5–5.2)
ALP SERPL-CCNC: 52 U/L — SIGNIFICANT CHANGE UP (ref 30–115)
ALT FLD-CCNC: 11 U/L — SIGNIFICANT CHANGE UP (ref 0–41)
ANION GAP SERPL CALC-SCNC: 14 MMOL/L — SIGNIFICANT CHANGE UP (ref 7–14)
AST SERPL-CCNC: 13 U/L — SIGNIFICANT CHANGE UP (ref 0–41)
BASOPHILS # BLD AUTO: 0.05 K/UL — SIGNIFICANT CHANGE UP (ref 0–0.2)
BASOPHILS NFR BLD AUTO: 0.7 % — SIGNIFICANT CHANGE UP (ref 0–1)
BILIRUB SERPL-MCNC: 0.8 MG/DL — SIGNIFICANT CHANGE UP (ref 0.2–1.2)
BUN SERPL-MCNC: 18 MG/DL — SIGNIFICANT CHANGE UP (ref 10–20)
CALCIUM SERPL-MCNC: 8.7 MG/DL — SIGNIFICANT CHANGE UP (ref 8.5–10.1)
CHLORIDE SERPL-SCNC: 103 MMOL/L — SIGNIFICANT CHANGE UP (ref 98–110)
CO2 SERPL-SCNC: 23 MMOL/L — SIGNIFICANT CHANGE UP (ref 17–32)
CREAT SERPL-MCNC: 1.2 MG/DL — SIGNIFICANT CHANGE UP (ref 0.7–1.5)
EOSINOPHIL # BLD AUTO: 0.11 K/UL — SIGNIFICANT CHANGE UP (ref 0–0.7)
EOSINOPHIL NFR BLD AUTO: 1.5 % — SIGNIFICANT CHANGE UP (ref 0–8)
GLUCOSE SERPL-MCNC: 96 MG/DL — SIGNIFICANT CHANGE UP (ref 70–99)
HCT VFR BLD CALC: 39.4 % — LOW (ref 42–52)
HGB BLD-MCNC: 13.5 G/DL — LOW (ref 14–18)
IMM GRANULOCYTES NFR BLD AUTO: 0.3 % — SIGNIFICANT CHANGE UP (ref 0.1–0.3)
LYMPHOCYTES # BLD AUTO: 1.92 K/UL — SIGNIFICANT CHANGE UP (ref 1.2–3.4)
LYMPHOCYTES # BLD AUTO: 25.9 % — SIGNIFICANT CHANGE UP (ref 20.5–51.1)
MCHC RBC-ENTMCNC: 28.1 PG — SIGNIFICANT CHANGE UP (ref 27–31)
MCHC RBC-ENTMCNC: 34.3 G/DL — SIGNIFICANT CHANGE UP (ref 32–37)
MCV RBC AUTO: 81.9 FL — SIGNIFICANT CHANGE UP (ref 80–94)
MONOCYTES # BLD AUTO: 0.76 K/UL — HIGH (ref 0.1–0.6)
MONOCYTES NFR BLD AUTO: 10.3 % — HIGH (ref 1.7–9.3)
NEUTROPHILS # BLD AUTO: 4.55 K/UL — SIGNIFICANT CHANGE UP (ref 1.4–6.5)
NEUTROPHILS NFR BLD AUTO: 61.3 % — SIGNIFICANT CHANGE UP (ref 42.2–75.2)
NRBC # BLD: 0 /100 WBCS — SIGNIFICANT CHANGE UP (ref 0–0)
PLATELET # BLD AUTO: 158 K/UL — SIGNIFICANT CHANGE UP (ref 130–400)
POTASSIUM SERPL-MCNC: 4.1 MMOL/L — SIGNIFICANT CHANGE UP (ref 3.5–5)
POTASSIUM SERPL-SCNC: 4.1 MMOL/L — SIGNIFICANT CHANGE UP (ref 3.5–5)
PROT SERPL-MCNC: 5.8 G/DL — LOW (ref 6–8)
RBC # BLD: 4.81 M/UL — SIGNIFICANT CHANGE UP (ref 4.7–6.1)
RBC # FLD: 12.3 % — SIGNIFICANT CHANGE UP (ref 11.5–14.5)
SODIUM SERPL-SCNC: 140 MMOL/L — SIGNIFICANT CHANGE UP (ref 135–146)
WBC # BLD: 7.41 K/UL — SIGNIFICANT CHANGE UP (ref 4.8–10.8)
WBC # FLD AUTO: 7.41 K/UL — SIGNIFICANT CHANGE UP (ref 4.8–10.8)

## 2018-03-25 RX ADMIN — TICAGRELOR 90 MILLIGRAM(S): 90 TABLET ORAL at 06:42

## 2018-03-25 RX ADMIN — Medication 81 MILLIGRAM(S): at 11:50

## 2018-03-25 NOTE — DISCHARGE NOTE ADULT - PLAN OF CARE
complete resolution got stent In LAD  take meds and follow up with cardiologist optimal blood cholesterol control take meds and low fat diet blood pressure control take meds and low salt diet rate control not on anti coagualtion by choice  cw rate control

## 2018-03-25 NOTE — PROGRESS NOTE ADULT - SUBJECTIVE AND OBJECTIVE BOX
SUBJECTIVE:    Patient is a 62y old Male who presents with a chief complaint of atypical chest pain with exertional sx (23 Mar 2018 11:34)    Currently admitted to medicine with the primary diagnosis of Chest pain     Today is hospital day 3d. This morning he is resting comfortably in bed and reports no new issues or overnight events. No chest pain, SOB, numbness, tingling, dizziness, lightheadedness. No hematoma noted on R groin     PAST MEDICAL & SURGICAL HISTORY  Dyslipidemia  Hypertension  Atrial fibrillation  No significant past surgical history    SOCIAL HISTORY:  ex smoker ,quit 30 yrs ago    ALLERGIES:  No Known Allergies    MEDICATIONS:  MEDICATIONS  (STANDING):  aspirin enteric coated 81 milliGRAM(s) Oral daily  propranolol 40 milliGRAM(s) Oral daily  simvastatin 40 milliGRAM(s) Oral at bedtime  sodium chloride 0.9%. 1000 milliLiter(s) (75 mL/Hr) IV Continuous <Continuous>  ticagrelor 90 milliGRAM(s) Oral two times a day  valsartan 80 milliGRAM(s) Oral daily  verapamil  milliGRAM(s) Oral daily    MEDICATIONS  (PRN):    VITALS:   ICU Vital Signs Last 24 Hrs  T(C): 36 (25 Mar 2018 04:00), Max: 36.6 (24 Mar 2018 08:00)  T(F): 96.8 (25 Mar 2018 04:00), Max: 97.9 (24 Mar 2018 08:00)  HR: 54 (25 Mar 2018 06:00) (52 - 86)  BP: 105/64 (25 Mar 2018 06:00) (99/68 - 148/74)  BP(mean): 78 (25 Mar 2018 06:00) (76 - 93)  ABP: --  ABP(mean): --  RR: 11 (25 Mar 2018 06:00) (9 - 22)  SpO2: 98% (24 Mar 2018 18:00) (96% - 100%)      LABS:                                 13.5   7.41  )-----------( 158      ( 25 Mar 2018 04:26 )             39.4   03-24    137  |  100  |  18  ----------------------------<  99  3.9   |  26  |  1.0    Ca    8.5      24 Mar 2018 04:21  Mg     2.0     03-24                   Lipid Profile in AM (03.24.18 @ 04:21)    Total Cholesterol/HDL Ratio Measurement: 5.6 Ratio    Cholesterol, Serum: 161 mg/dL    Triglycerides, Serum: 207 mg/dL    HDL Cholesterol, Serum: 29 mg/dL    Direct LDL: 108: LDL Cholesterol --- Interpretive Comment (for adults 18 and over)    Hemoglobin A1C with Mean Plasma Glucose (03.24.18 @ 04:21)    Hemoglobin A1C, Whole Blood: 5.4 %    Mean Plasma Glucose: 108 mg/dL      TPro  6.8  /  Alb  4.7  /  TBili  0.6  /  DBili  x   /  AST  14  /  ALT  14  /  AlkPhos  57  03-23    PT/INR - ( 23 Mar 2018 06:25 )   PT: 10.50 sec;   INR: 0.97 ratio        Creatine Kinase, Serum: 89 U/L (03-24-18 @ 04:21)  Troponin T, Serum: 0.03 ng/mL <H> (03-24-18 @ 04:21)      CARDIAC MARKERS ( 24 Mar 2018 04:21 )  x     / 0.03 ng/mL / 89 U/L / x     / 4.6 ng/mL  CARDIAC MARKERS ( 23 Mar 2018 06:25 )  x     / <0.01 ng/mL / 87 U/L / x     / 3.0 ng/mL      RADIOLOGY:  < from: CT Heart with Coronaries (03.23.18 @ 09:15) >    Atherosclerotic disease within the LAD contributing up to severe stenosis   and an area of likely total occlusion at theorigin of the first diagonal   branch.      The total Agatston coronary artery calcium score equals 43, which   corresponds to 44th percentile for age, gender and ethnicity.     CAD-RADS 5.    < from: Xray Chest 2 Views PA/Lat (03.23.18 @ 06:40) >  No radiographic evidence of acute cardiopulmonary disease.    < end of copied text >  < from: Transthoracic Echocardiogram (03.12.18 @ 07:26) >  ummary:   1. Left ventricular ejection fraction, by visual estimation, is 60 to   65%.   2. Normal global left ventricular systolic function.   3. Spectral Doppler shows impaired relaxation pattern of left   ventricular myocardial filling (Grade I diastolic dysfunction).    < end of copied text >      PHYSICAL EXAM:  GEN: No acute distress  HEENT: NC/AT   LUNGS: Clear to auscultation bilaterally   HEART: S1/S2 present. RRR.   ABD: Soft, non-tender, non-distended. Bowel sounds present  EXT: no lower extremity edema, no r groin hematoma  NEURO: AAOX3 SUBJECTIVE:    Patient is a 62y old Male who presents with a chief complaint of atypical chest pain with exertional sx (23 Mar 2018 11:34)    Currently admitted to medicine with the primary diagnosis of Chest pain     Today is hospital day 3d. This morning he is resting comfortably in bed and reports no new issues or overnight events. No chest pain, SOB, numbness, tingling, dizziness, lightheadedness.  rt sided groin echymosis noticed    PAST MEDICAL & SURGICAL HISTORY  Dyslipidemia  Hypertension  Atrial fibrillation  No significant past surgical history    SOCIAL HISTORY:  ex smoker ,quit 30 yrs ago    ALLERGIES:  No Known Allergies    MEDICATIONS:  MEDICATIONS  (STANDING):  aspirin enteric coated 81 milliGRAM(s) Oral daily  propranolol 40 milliGRAM(s) Oral daily  simvastatin 40 milliGRAM(s) Oral at bedtime  sodium chloride 0.9%. 1000 milliLiter(s) (75 mL/Hr) IV Continuous <Continuous>  ticagrelor 90 milliGRAM(s) Oral two times a day  valsartan 80 milliGRAM(s) Oral daily  verapamil  milliGRAM(s) Oral daily    MEDICATIONS  (PRN):    VITALS:   ICU Vital Signs Last 24 Hrs  T(C): 36 (25 Mar 2018 04:00), Max: 36.6 (24 Mar 2018 08:00)  T(F): 96.8 (25 Mar 2018 04:00), Max: 97.9 (24 Mar 2018 08:00)  HR: 54 (25 Mar 2018 06:00) (52 - 86)  BP: 105/64 (25 Mar 2018 06:00) (99/68 - 148/74)  BP(mean): 78 (25 Mar 2018 06:00) (76 - 93)  ABP: --  ABP(mean): --  RR: 11 (25 Mar 2018 06:00) (9 - 22)  SpO2: 98% (24 Mar 2018 18:00) (96% - 100%)      LABS:                                 13.5   7.41  )-----------( 158      ( 25 Mar 2018 04:26 )             39.4   03-24    137  |  100  |  18  ----------------------------<  99  3.9   |  26  |  1.0    Ca    8.5      24 Mar 2018 04:21  Mg     2.0     03-24                   Lipid Profile in AM (03.24.18 @ 04:21)    Total Cholesterol/HDL Ratio Measurement: 5.6 Ratio    Cholesterol, Serum: 161 mg/dL    Triglycerides, Serum: 207 mg/dL    HDL Cholesterol, Serum: 29 mg/dL    Direct LDL: 108: LDL Cholesterol --- Interpretive Comment (for adults 18 and over)    Hemoglobin A1C with Mean Plasma Glucose (03.24.18 @ 04:21)    Hemoglobin A1C, Whole Blood: 5.4 %    Mean Plasma Glucose: 108 mg/dL      TPro  6.8  /  Alb  4.7  /  TBili  0.6  /  DBili  x   /  AST  14  /  ALT  14  /  AlkPhos  57  03-23    PT/INR - ( 23 Mar 2018 06:25 )   PT: 10.50 sec;   INR: 0.97 ratio        Creatine Kinase, Serum: 89 U/L (03-24-18 @ 04:21)  Troponin T, Serum: 0.03 ng/mL <H> (03-24-18 @ 04:21)      CARDIAC MARKERS ( 24 Mar 2018 04:21 )  x     / 0.03 ng/mL / 89 U/L / x     / 4.6 ng/mL  CARDIAC MARKERS ( 23 Mar 2018 06:25 )  x     / <0.01 ng/mL / 87 U/L / x     / 3.0 ng/mL      RADIOLOGY:  < from: CT Heart with Coronaries (03.23.18 @ 09:15) >    Atherosclerotic disease within the LAD contributing up to severe stenosis   and an area of likely total occlusion at theorigin of the first diagonal   branch.      The total Agatston coronary artery calcium score equals 43, which   corresponds to 44th percentile for age, gender and ethnicity.     CAD-RADS 5.    < from: Xray Chest 2 Views PA/Lat (03.23.18 @ 06:40) >  No radiographic evidence of acute cardiopulmonary disease.    < end of copied text >  < from: Transthoracic Echocardiogram (03.12.18 @ 07:26) >  ummary:   1. Left ventricular ejection fraction, by visual estimation, is 60 to   65%.   2. Normal global left ventricular systolic function.   3. Spectral Doppler shows impaired relaxation pattern of left   ventricular myocardial filling (Grade I diastolic dysfunction).    < end of copied text >      PHYSICAL EXAM:  GEN: No acute distress  HEENT: NC/AT   LUNGS: Clear to auscultation bilaterally   HEART: S1/S2 present. RRR.   ABD: Soft, non-tender, non-distended. Bowel sounds present  EXT: no lower extremity edema, no r groin hematoma  NEURO: AAOX3 SUBJECTIVE:    Patient is a 62y old Male who presents with a chief complaint of atypical chest pain with exertional sx (23 Mar 2018 11:34)    Currently admitted to medicine with the primary diagnosis of Chest pain     Today is hospital day 3d. This morning he is resting comfortably in bed and reports no new issues or overnight events. No chest pain, SOB, numbness, tingling, dizziness, lightheadedness.  rt sided groin echymosis noticed    PAST MEDICAL & SURGICAL HISTORY  Dyslipidemia  Hypertension  Atrial fibrillation  No significant past surgical history    SOCIAL HISTORY:  ex smoker ,quit 30 yrs ago    ALLERGIES:  No Known Allergies    MEDICATIONS:  MEDICATIONS  (STANDING):  aspirin enteric coated 81 milliGRAM(s) Oral daily  propranolol 40 milliGRAM(s) Oral daily  simvastatin 40 milliGRAM(s) Oral at bedtime  sodium chloride 0.9%. 1000 milliLiter(s) (75 mL/Hr) IV Continuous <Continuous>  ticagrelor 90 milliGRAM(s) Oral two times a day  valsartan 80 milliGRAM(s) Oral daily  verapamil  milliGRAM(s) Oral daily    MEDICATIONS  (PRN):    VITALS:   ICU Vital Signs Last 24 Hrs  T(C): 36 (25 Mar 2018 04:00), Max: 36.6 (24 Mar 2018 08:00)  T(F): 96.8 (25 Mar 2018 04:00), Max: 97.9 (24 Mar 2018 08:00)  HR: 54 (25 Mar 2018 06:00) (52 - 86)  BP: 105/64 (25 Mar 2018 06:00) (99/68 - 148/74)  BP(mean): 78 (25 Mar 2018 06:00) (76 - 93)  ABP: --  ABP(mean): --  RR: 11 (25 Mar 2018 06:00) (9 - 22)  SpO2: 98% (24 Mar 2018 18:00) (96% - 100%)      LABS:                                 13.5   7.41  )-----------( 158      ( 25 Mar 2018 04:26 )             39.4   03-24    137  |  100  |  18  ----------------------------<  99  3.9   |  26  |  1.0    Ca    8.5      24 Mar 2018 04:21  Mg     2.0     03-24                   Lipid Profile in AM (03.24.18 @ 04:21)    Total Cholesterol/HDL Ratio Measurement: 5.6 Ratio    Cholesterol, Serum: 161 mg/dL    Triglycerides, Serum: 207 mg/dL    HDL Cholesterol, Serum: 29 mg/dL    Direct LDL: 108: LDL Cholesterol --- Interpretive Comment (for adults 18 and over)    Hemoglobin A1C with Mean Plasma Glucose (03.24.18 @ 04:21)    Hemoglobin A1C, Whole Blood: 5.4 %    Mean Plasma Glucose: 108 mg/dL      TPro  6.8  /  Alb  4.7  /  TBili  0.6  /  DBili  x   /  AST  14  /  ALT  14  /  AlkPhos  57  03-23    PT/INR - ( 23 Mar 2018 06:25 )   PT: 10.50 sec;   INR: 0.97 ratio        Creatine Kinase, Serum: 89 U/L (03-24-18 @ 04:21)  Troponin T, Serum: 0.03 ng/mL <H> (03-24-18 @ 04:21)      CARDIAC MARKERS ( 24 Mar 2018 04:21 )  x     / 0.03 ng/mL / 89 U/L / x     / 4.6 ng/mL  CARDIAC MARKERS ( 23 Mar 2018 06:25 )  x     / <0.01 ng/mL / 87 U/L / x     / 3.0 ng/mL      RADIOLOGY:  < from: CT Heart with Coronaries (03.23.18 @ 09:15) >    Atherosclerotic disease within the LAD contributing up to severe stenosis   and an area of likely total occlusion at theorigin of the first diagonal   branch.      The total Agatston coronary artery calcium score equals 43, which   corresponds to 44th percentile for age, gender and ethnicity.     CAD-RADS 5.    < from: Xray Chest 2 Views PA/Lat (03.23.18 @ 06:40) >  No radiographic evidence of acute cardiopulmonary disease.    < end of copied text >  < from: Transthoracic Echocardiogram (03.12.18 @ 07:26) >  ummary:   1. Left ventricular ejection fraction, by visual estimation, is 60 to   65%.   2. Normal global left ventricular systolic function.   3. Spectral Doppler shows impaired relaxation pattern of left   ventricular myocardial filling (Grade I diastolic dysfunction).    < end of copied text >      PHYSICAL EXAM:  GEN: No acute distress  HEENT: NC/AT   LUNGS: Clear to auscultation bilaterally   HEART: S1/S2 present. RRR.   ABD: Soft, non-tender, non-distended. Bowel sounds present  EXT: no lower extremity edema,  r groin echymosis  NEURO: AAOX3

## 2018-03-25 NOTE — DISCHARGE NOTE ADULT - PROVIDER TOKENS
TOKEN:'[id number]',FREE:[LAST:[Lucero],PHONE:[(   )    -],FAX:[(   )    -],ADDRESS:[known to patient]]

## 2018-03-25 NOTE — DISCHARGE NOTE ADULT - CARE PROVIDER_API CALL
Troy Zhou (MD), Cardiovascular Disease; Internal Medicine  [address] [place] [address]  Phone: [phone]  Fax: [phone]    Lucero,   known to patient  Phone: (   )    -  Fax: (   )    -

## 2018-03-25 NOTE — DISCHARGE NOTE ADULT - PATIENT PORTAL LINK FT
You can access the [facility] Patient Portal, offered by [facility], by registering with the following website: [email or web address]

## 2018-03-25 NOTE — DISCHARGE NOTE ADULT - MEDICATION SUMMARY - MEDICATIONS TO TAKE
I will START or STAY ON the medications listed below when I get home from the hospital:    aspirin 325 mg oral tablet  -- 1 tab(s) by mouth once a day  -- Indication: For UA    valsartan 80 mg oral tablet  -- 1 tab(s) by mouth once a day  -- Indication: For HTN    verapamil 240 mg/24 hours oral capsule, extended release  -- 1 cap(s) by mouth once a day  -- Indication: For HTN    propranolol 40 mg oral tablet  -- 1 tab(s) by mouth once a day  -- Indication: For UA    simvastatin 40 mg oral tablet  -- 1 tab(s) by mouth once a day (at bedtime)  -- Indication: For UA    Brilinta (ticagrelor) 90 mg oral tablet  -- 1 tab(s) by mouth 2 times a day   -- It is very important that you take or use this exactly as directed.  Do not skip doses or discontinue unless directed by your doctor.  Obtain medical advice before taking any non-prescription drugs as some may affect the action of this medication.    -- Indication: For UA

## 2018-03-25 NOTE — PROGRESS NOTE ADULT - ASSESSMENT
61 y/o M with PMHx Afibx20 years, HTN, DLD presenting for recurrent R sided chest and arm pain.    1. Atypical Chest pain with exertional sx, r/o ischemia:  - negative nuclear stress but positive CT coronary  - s/p stent in LAD   - A1c & lipid panel done  - complicated by femoral oozing, stitches applied by DR FIELD; pressure dressing on overnight  - no bleed to R groin today; CBC stable  - follow cardiac recs for further care  2d echo as per cardio    2. HTN: c/w verapamil, propranolol, valsartan  3. DLD: c/w simvastatin  4. DVT: heparin 5000q8  5. Afib: HR control with propranolol, not on any A/C, only on  mg daily 61 y/o M with PMHx Afibx20 years, HTN, DLD presenting for recurrent R sided chest and arm pain.    1. Atypical Chest pain with exertional sx, r/o ischemia:  - negative nuclear stress but positive CT coronary  - s/p stent in LAD   - A1c & lipid panel done  - complicated by femoral oozing, stitches applied by DR FIELD; pressure dressing on overnight  - no bleed to R groin today; CBC stable  - follow cardiac recs for further care  2d echo as per cardio  -d/c after 2 D echo      2. HTN: c/w verapamil, propranolol, valsartan  3. DLD: c/w simvastatin  4. DVT: heparin 5000q8  5. Afib: HR control with propranolol, not on any A/C, only on  mg daily    PLAN;  cw DAPT,BB,statin,ACE-i  repeat 2 D echo  discharge if echo normal 61 y/o M with PMHx Afibx20 years, HTN, DLD presenting for recurrent R sided chest and arm pain.    1. Atypical Chest pain with exertional sx, r/o ischemia:  - negative nuclear stress but positive CT coronary  - s/p stent in LAD   - A1c & lipid panel done  - complicated by femoral oozing, stitches applied by DR FIELD; pressure dressing on overnight  - no bleed to R groin today; CBC stable  - follow cardiac recs for further care  2d echo as per cardio       2. HTN: c/w verapamil, propranolol, valsartan  3. DLD: c/w simvastatin  4. DVT: heparin 5000q8  5. Afib: HR control with propranolol, not on any A/C, only on  mg daily    PLAN;  cw DAPT,BB,statin,ACE-i  repeat 2 D echo  discharge as per Cardio  out pt echo

## 2018-03-25 NOTE — DISCHARGE NOTE ADULT - CARE PLAN
Principal Discharge DX:	Chest pain  Goal:	complete resolution  Assessment and plan of treatment:	got stent In LAD  take meds and follow up with cardiologist  Secondary Diagnosis:	Dyslipidemia  Goal:	optimal blood cholesterol control  Assessment and plan of treatment:	take meds and low fat diet  Secondary Diagnosis:	Hypertension  Goal:	blood pressure control  Assessment and plan of treatment:	take meds and low salt diet  Secondary Diagnosis:	Atrial fibrillation  Goal:	rate control  Assessment and plan of treatment:	not on anti coagualtion by choice  cw rate control

## 2018-03-25 NOTE — PROGRESS NOTE ADULT - SUBJECTIVE AND OBJECTIVE BOX
SUBJ:  Unstable angina, s/p PCI of LAD.      MEDICATIONS  (STANDING):  aspirin enteric coated 81 milliGRAM(s) Oral daily  propranolol 40 milliGRAM(s) Oral daily  simvastatin 40 milliGRAM(s) Oral at bedtime  sodium chloride 0.9%. 1000 milliLiter(s) (75 mL/Hr) IV Continuous <Continuous>  ticagrelor 90 milliGRAM(s) Oral two times a day  valsartan 80 milliGRAM(s) Oral daily  verapamil  milliGRAM(s) Oral daily    MEDICATIONS  (PRN):            Vital Signs Last 24 Hrs  T(C): 36.2 (25 Mar 2018 08:00), Max: 36.6 (24 Mar 2018 16:00)  T(F): 97.1 (25 Mar 2018 08:00), Max: 97.9 (24 Mar 2018 16:00)  HR: 64 (25 Mar 2018 08:00) (52 - 86)  BP: 127/81 (25 Mar 2018 08:00) (99/68 - 133/80)  BP(mean): 78 (25 Mar 2018 06:00) (76 - 93)  RR: 15 (25 Mar 2018 08:00) (9 - 22)  SpO2: 96% (25 Mar 2018 08:00) (96% - 98%)    REVIEW OF SYSTEMS:  CONSTITUTIONAL: No fever, weight loss, or fatigue  Patient denies chest pain, shortness of breath or syncopal episodes.       PHYSICAL EXAM:  · CONSTITUTIONAL:	Well-developed, well nourished,   BMI-  · RESPIRATORY:   breath sounds equal; good air movement; respirations non-labored; clear to auscultation bilaterally; no rales, rhonchi or wheezing  · CARDIOVASCULAR	regular rate and rhythm,  no rub,  no murmur, no gallop  · EXTREMITIES: No edema, right groin: ecchymosis unchanged  · VASCULAR: 	Equal pedal pulses  	  TELEMETRY: SR    TTE: no done    LABS:                        13.5   7.41  )-----------( 158      ( 25 Mar 2018 04:26 )             39.4     03-25    140  |  103  |  18  ----------------------------<  96  4.1   |  23  |  1.2    Ca    8.7      25 Mar 2018 04:26  Mg     2.0     03-24    TPro  5.8<L>  /  Alb  4.0  /  TBili  0.8  /  DBili  x   /  AST  13  /  ALT  11  /  AlkPhos  52  03-25    CARDIAC MARKERS ( 24 Mar 2018 04:21 )  x     / 0.03 ng/mL / 89 U/L / x     / 4.6 ng/mL          I&O's Summary    24 Mar 2018 07:01  -  25 Mar 2018 07:00  --------------------------------------------------------  IN: 560 mL / OUT: 1251 mL / NET: -691 mL    25 Mar 2018 07:01  -  25 Mar 2018 11:19  --------------------------------------------------------  IN: 0 mL / OUT: 250 mL / NET: -250 mL      IMPRESSION AND PLAN:  Unstable angina, s/p PCI of LAD.  Right groin ecchymosis, stable H/H.    Plan:  D/c home today (ECHO can be scheduled as outpatient).  Brilinta samples were given to patient by Dr. Rebolledo.  F/u with Dr. Zhou scheduled next week.

## 2018-03-27 DIAGNOSIS — I10 ESSENTIAL (PRIMARY) HYPERTENSION: ICD-10-CM

## 2018-03-27 DIAGNOSIS — I48.91 UNSPECIFIED ATRIAL FIBRILLATION: ICD-10-CM

## 2018-03-27 DIAGNOSIS — E78.00 PURE HYPERCHOLESTEROLEMIA, UNSPECIFIED: ICD-10-CM

## 2018-03-27 DIAGNOSIS — Z87.891 PERSONAL HISTORY OF NICOTINE DEPENDENCE: ICD-10-CM

## 2018-03-27 DIAGNOSIS — R07.9 CHEST PAIN, UNSPECIFIED: ICD-10-CM

## 2018-03-27 DIAGNOSIS — I25.110 ATHEROSCLEROTIC HEART DISEASE OF NATIVE CORONARY ARTERY WITH UNSTABLE ANGINA PECTORIS: ICD-10-CM

## 2018-06-01 ENCOUNTER — APPOINTMENT (OUTPATIENT)
Dept: CARDIOLOGY | Facility: CLINIC | Age: 63
End: 2018-06-01

## 2019-06-05 ENCOUNTER — APPOINTMENT (OUTPATIENT)
Dept: CARDIOLOGY | Facility: CLINIC | Age: 64
End: 2019-06-05

## 2019-06-19 ENCOUNTER — APPOINTMENT (OUTPATIENT)
Dept: CARDIOLOGY | Facility: CLINIC | Age: 64
End: 2019-06-19
Payer: MEDICARE

## 2019-06-19 PROCEDURE — 93000 ELECTROCARDIOGRAM COMPLETE: CPT

## 2019-06-19 PROCEDURE — 99213 OFFICE O/P EST LOW 20 MIN: CPT

## 2019-08-05 NOTE — ED ADULT NURSE NOTE - NS ED NURSE PATIENT LEFT UNIT TIME
"HPI  Mr. Meredith is a 62 year old male with history of HCM without obstruction (dx 2006, EF 20% improved to 60%), VT s/p ICD 2012, nonobstructive CAD (cath 2013), AF s/p PVI X 3 (2011, 2016, 2018) and DCCV X 10, currently on Sotalol and Xarelto who presents to clinic for evaluation and management. Recently underwent hip surgery without event.    Has moderate to severe fatigue ever since early Spring, corresponding roughly to when he has been in atrial fibrillation. He wakes up in the morning and feels he could \"fall right back asleep.\" Any exertion or activity causes mild to moderate MART. He is able to \"keep going,\" but this is certainly different from baseline. He cannot walk more than one to two blocks without feeling significantly winded and needed to take a break. No PND nor orthopnea. No TY nor abdominal swelling. Water weight has been stable. No palpitations nor syncope. He denies any problems with his medications and reports compliance.    Past Medical History:   Diagnosis Date     Atrial fibrillation (H) 12/9/11    failed medication, multiple DC cardioveresions; s/p Left atrial ablation to eliminate atrial fibrillation 12/9/11     Chest pain      CHF (congestive heart failure) (H) 7/30/2016     Coronary artery disease      DJD (degenerative joint disease)      Hip arthritis 1/15/2014     Hypertension      Hypertrophic cardiomyopathy (H) 10/09     Other abnormal heart sounds      Pacemaker     ICD     Palpitations      Pneumonia, organism unspecified(486)      Prediabetes 7/10/15    A1C 6.5     Status post implantation of automatic cardioverter/defibrillator (AICD)      Ventricular tachycardia (H)    - HCM diagnosed '06, previously burnt out (EF 20%) now with recovered EF  - h/o VT s/p dual chamber ICD '12  - AF s/p PVI X 2 ('11, '16, '18), h/o DCCV X 10    Meds:  acetaminophen (TYLENOL) 325 MG tablet Take 325-650 mg by mouth every 6 hours as needed   amoxicillin (AMOXIL) 500 MG tablet Take 4 tablets (2000 " mg) by mouth 1 hour before dental procedures.   atorvastatin (LIPITOR) 20 MG tablet Take 1 tablet (20 mg) by mouth daily   cyanocobalamin (VITAMIN B-12) 100 MCG tablet Take 100 mcg by mouth daily   HYDROcodone-acetaminophen (NORCO) 5-325 MG tablet Take 1 tablet by mouth every 8 hours as needed for severe pain   HYDROcodone-acetaminophen (NORCO) 5-325 MG tablet Take 1-2 tablets by mouth every 4 hours as needed for severe pain   metFORMIN (GLUCOPHAGE-XR) 500 MG 24 hr tablet Take 1 tablet (500 mg) by mouth daily (with dinner) for 7 days, THEN 2 tablets (1,000 mg) daily (with dinner). (Patient taking differently: Take 2 tablets (1,000 mg) daily (with dinner).)   metoprolol succinate (TOPROL-XL) 50 MG 24 hr tablet Take 1 tablet (50 mg) by mouth daily   multivitamin, therapeutic (THERA-VIT) TABS Take 1 tablet by mouth daily   senna-docusate (SENOKOT-S/PERICOLACE) 8.6-50 MG tablet Take 1 tablet by mouth 2 times daily as needed for constipation   sotalol (BETAPACE) 120 MG tablet Take 1 tablet (120 mg) by mouth 2 times daily   spironolactone (ALDACTONE) 50 MG tablet Take 1 tablet (50 mg) by mouth daily   XARELTO 20 MG TABS tablet Take 1 tablet (20 mg) by mouth daily (with dinner)   zolpidem (AMBIEN) 10 MG tablet Take 0.5-1 tablets (5-10 mg) by mouth nightly as needed for sleep   albuterol (PROAIR HFA/PROVENTIL HFA/VENTOLIN HFA) 108 (90 Base) MCG/ACT inhaler Inhale 2 puffs into the lungs every 4 hours as needed for shortness of breath / dyspnea or wheezing       Social History     Socioeconomic History     Marital status:      Spouse name: Not on file     Number of children: Not on file     Years of education: Not on file     Highest education level: Not on file   Occupational History     Occupation:      Employer: [employer]   Social Needs     Financial resource strain: Not on file     Food insecurity:     Worry: Not on file     Inability: Not on file     Transportation needs:     Medical: Not on  file     Non-medical: Not on file   Tobacco Use     Smoking status: Former Smoker     Packs/day: 1.00     Years: 40.00     Pack years: 40.00     Types: Cigarettes     Start date: 1/1/1975     Last attempt to quit: 12/11/2015     Years since quitting: 3.5     Smokeless tobacco: Never Used   Substance and Sexual Activity     Alcohol use: Yes     Alcohol/week: 0.0 oz     Comment: 1 drink per week     Drug use: No     Sexual activity: Yes     Partners: Female   Lifestyle     Physical activity:     Days per week: Not on file     Minutes per session: Not on file     Stress: Not on file   Relationships     Social connections:     Talks on phone: Not on file     Gets together: Not on file     Attends [employer] service: Not on file     Active member of club or organization: Not on file     Attends meetings of clubs or organizations: Not on file     Relationship status: Not on file     Intimate partner violence:     Fear of current or ex partner: Not on file     Emotionally abused: Not on file     Physically abused: Not on file     Forced sexual activity: Not on file   Other Topics Concern      Service Not Asked     Blood Transfusions Not Asked     Caffeine Concern Not Asked     Occupational Exposure Not Asked     Hobby Hazards Not Asked     Sleep Concern Not Asked     Stress Concern Not Asked     Weight Concern Not Asked     Special Diet Not Asked     Back Care Not Asked     Exercise No     Bike Helmet Not Asked     Seat Belt Yes     Self-Exams Not Asked     Parent/sibling w/ CABG, MI or angioplasty before 65F 55M? No   Social History Narrative     Not on file       Family History   Problem Relation Age of Onset     Heart Disease Mother         unknown     Obesity Mother      Gastrointestinal Disease Mother         diverticulitis     Diabetes Maternal Grandmother      Diabetes Paternal Grandmother      Cerebrovascular Disease Paternal Grandmother 94     Diabetes Paternal Grandfather      Cerebrovascular Disease  "Paternal Grandfather 78     Diabetes Son      MARLONAMichealD. Father         CABG age 78     Heart Disease Father         CABG x5     Diabetes Maternal Grandfather      LUNG DISEASE No family hx of      Deep Vein Thrombosis (DVT) No family hx of      Anesthesia Reaction No family hx of      Melanoma No family hx of      Skin Cancer No family hx of        ROS:   Constitutional: No fever, chills, or sweats. No weight gain/loss.   ENT: No visual disturbance, ear ache, epistaxis, sore throat.   Allergies/Immunologic: Negative.   Respiratory: No cough, hemoptysis.   Cardiovascular: As per HPI.   GI: No nausea, vomiting, hematemesis, melena, or hematochezia.   : No urinary frequency, dysuria, or hematuria.   Integument: Negative.   Psychiatric: Negative.   Neuro: Negative.   Endocrinology: Negative.   Musculoskeletal: Ongoing hip pain      /86 (BP Location: Right arm, Patient Position: Chair, Cuff Size: Adult Large)   Pulse 76   Ht 1.803 m (5' 11\")   Wt 95.3 kg (210 lb)   SpO2 97%   BMI 29.29 kg/m    General: appears comfortable, alert and articulate  Head: normocephalic, atraumatic  Eyes: anicteric sclera, EOMI  Neck: no adenopathy, 2+ carotids without bruits  Orophyarynx: moist mucosa, no lesions, dentition intact  Heart: regular, S1/S2, 2/6 systolic murmur, no gallop or rub, estimated JVP 6-7cm  Lungs: clear, no rales or wheezing  Abdomen: soft, non-tender, bowel sounds present, no hepatomegaly  Extremities: no clubbing, cyanosis or edema  Neurological: normal speech and affect, no gross motor deficits    CPX Echo 6/1/2018:  Interpretation Summary  Submaximal treadmill stress test in a patient with a diagnosis of HCM.  The Ramirez treadmill score is 8 (low risk).  Exercise was stopped due to fatigue.  Normal blood pressure response to exercise.  No ECG evidence of ischemia.  No supraventricular or ventricular arrhythmias. Frequent PVCs noted throughout the study.  Normal biventricular function with mild asymmetrical " "septal hypertrophy (12mm).  No dynamic outflow tract obstruction is present at rest or with exercise.  Normal segmental wall motion at rest and with exercise.  Minimal augmentation in LV function with exercise.  Average functional capacity for age.         CPX 2/15/2019:      Device interrogation 4/22/19  Alert received for AF burden. His presenting rhythm is AF/VS- ~70 bpm. AF has been 100% since 3/14/19. Normal device function. AP= 6% and = 3%. Lead trends appear stable. Battery estimates 4 years to CARMEN.    CTA coronary angiogram today  IMPRESSION:  1.  No evidence of coronary artery atherosclerosis or stenosis.  2.  Myocardial bridging involving the mid LAD.  3.  Total Agatston score 0 placing the patient in the lowest percentile when compared to age and gender matched control group.    Device Interrogation Today:  Patient seen in the [facility] [place], [place] for evaluation and iterative programming of his [employer] dual lead ICD per MD orders. Patient has an appointment to see Martin Ware and Kenneth today. Normal ICD function.  Since 1/18/19, there have been 400 NSVT, 2 other untreated episodes, and 211 AT/AF episodes recorded.  The VT-1 monitored episodes are AF w/RVR with rates in the 140s.  The 2 NSVT episodes available for review last 12 beats in duration with rates of 162-179 bpm.  AF burden = 85% since 1/18/19 and AF appear persistent since approximately mid March. Patient reports he takes Xarelto.   Intrinsic rhythm = AF @  bpm. AP = 2%.  = 5%.  Estimated battery longevity to CARMEN = 3.5years.  Lead trends appear stable. Patient reports that he has been  feeling \"tired\".  Plan for patient to send a remote transmission in 3 months and return to clinic in 6 months.  EH Dempsey RN. Dual lead ICDI have reviewed and interpreted the device interrogation, settings, programming and nurse's summary. The device is functioning within normal device parameters. I agree with the current findings, assessment and " plan.    Assessment/Plan:    Mr. Meredith is a 62 year old male with history of HCM (dx 2006, EF 20% improved to 60%), VT s/p ICD 2012, nonobstructive CAD (cath 2013), AF s/p PVI X 3 (2011, 2016, 2018) and DCCV X 10, currently on sotalol and Xarelto who presents to clinic for evaluation and management. Recently underwent hip surgery without event. He remains in atrial fibrillation since 3/2019.    # Atrial Fibrillation --multiple recurrences with h/o multiple ablations and cardioversion. Rate controlled. From a symptom perspective, unclear if functional limitation is related to AF or recent hip issues (CPX results have been similar when in AF and NSR).  - continue Xarelto  - continue Sotalol 120 BID   - continue Metoprolol 50 XL daily   - follow-up with EP on management    # Hypertrophic cardiomyopathy -- previously burnt out with EF 20% ('13), now recovered (EF 60%).  No evidence of LVOT obstruction.   - continue metoprolol XL 50mg daily   - continue aldactone  50mg daily  - pt aware of exercise restrictions and family screening recommendations    # Nonobstructive CAD -- 10-30% stenoses on cath '13  - coronary CTA confirmed no significant disease  - continue atorvastatin 20     # HTN -- controlled  - continue metoprolol XL, aldactone    Overall, he is NYHA class III with significant limitation with light activity. Will defer to EP on further attempts to restore SR which he would certainly benefit from given his filling restriction. We could offer a right heart catheterization to evaluate filling pressures to evaluate how we could further optimize his hemodynamics. Would consider Entresto as well, which has some benefit in HFpEF patients. We may need to consider work-up for transplantation (with exception), at some point, pending his symptom and efficacy of further medical adjustments. He has non-obstructive physiology, and thus ASA / myectomy would not be of benefit. Would agree with plan, in conjunction with EP, for  change in antiarrhythmic therapy in effort to restore SR with RHC while in-patient (their plan is for Tikosyn loading).    Follow-up 3-4 months in conjunction with EP.  The patient was seen and discussed with Dr. Ware.    Abhinav Mao MD  Cardiology Fellow      I have reviewed today's vital signs, notes, medications, labs and imaging. I have also seen and examined the patient and agree with the findings and plan as outlined above.    Mona Ware MD  Section Head - Advanced Heart Failure, Transplantation and Mechanical Circulatory Support  Director - [facility]  Associate Professor of Medicine, [facility]         00:48

## 2019-10-02 ENCOUNTER — APPOINTMENT (OUTPATIENT)
Dept: CARDIOLOGY | Facility: CLINIC | Age: 64
End: 2019-10-02

## 2019-10-08 NOTE — PATIENT PROFILE ADULT. - TEACHING/LEARNING LEARNING PREFERENCES
No pertinent past medical history <<----- Click to add NO pertinent Past Medical History verbal instruction/written material

## 2019-10-10 ENCOUNTER — APPOINTMENT (OUTPATIENT)
Dept: CARDIOLOGY | Facility: CLINIC | Age: 64
End: 2019-10-10
Payer: COMMERCIAL

## 2019-10-10 PROCEDURE — 99213 OFFICE O/P EST LOW 20 MIN: CPT

## 2019-10-10 PROCEDURE — 93000 ELECTROCARDIOGRAM COMPLETE: CPT

## 2020-01-22 ENCOUNTER — APPOINTMENT (OUTPATIENT)
Dept: CARDIOLOGY | Facility: CLINIC | Age: 65
End: 2020-01-22
Payer: MEDICARE

## 2020-01-22 PROCEDURE — 93000 ELECTROCARDIOGRAM COMPLETE: CPT

## 2020-01-22 PROCEDURE — 99213 OFFICE O/P EST LOW 20 MIN: CPT

## 2020-07-06 ENCOUNTER — APPOINTMENT (OUTPATIENT)
Dept: CARDIOLOGY | Facility: CLINIC | Age: 65
End: 2020-07-06
Payer: MEDICARE

## 2020-07-06 PROCEDURE — 93000 ELECTROCARDIOGRAM COMPLETE: CPT

## 2020-07-06 PROCEDURE — 99213 OFFICE O/P EST LOW 20 MIN: CPT

## 2020-08-07 ENCOUNTER — RECORD ABSTRACTING (OUTPATIENT)
Age: 65
End: 2020-08-07

## 2020-08-07 DIAGNOSIS — Z87.891 PERSONAL HISTORY OF NICOTINE DEPENDENCE: ICD-10-CM

## 2020-08-07 RX ORDER — ASPIRIN 81 MG
81 TABLET, DELAYED RELEASE (ENTERIC COATED) ORAL DAILY
Refills: 0 | Status: ACTIVE | COMMUNITY

## 2020-08-24 ENCOUNTER — APPOINTMENT (OUTPATIENT)
Dept: CARDIOLOGY | Facility: CLINIC | Age: 65
End: 2020-08-24
Payer: MEDICARE

## 2020-08-24 PROCEDURE — 93351 STRESS TTE COMPLETE: CPT

## 2020-08-24 PROCEDURE — 93320 DOPPLER ECHO COMPLETE: CPT

## 2020-08-24 PROCEDURE — 93325 DOPPLER ECHO COLOR FLOW MAPG: CPT

## 2020-09-12 ENCOUNTER — RX RENEWAL (OUTPATIENT)
Age: 65
End: 2020-09-12

## 2020-10-30 ENCOUNTER — RX RENEWAL (OUTPATIENT)
Age: 65
End: 2020-10-30

## 2020-11-23 ENCOUNTER — APPOINTMENT (OUTPATIENT)
Dept: CARDIOLOGY | Facility: CLINIC | Age: 65
End: 2020-11-23
Payer: MEDICARE

## 2020-11-23 VITALS
HEIGHT: 72 IN | HEART RATE: 74 BPM | WEIGHT: 196 LBS | DIASTOLIC BLOOD PRESSURE: 80 MMHG | TEMPERATURE: 98 F | SYSTOLIC BLOOD PRESSURE: 118 MMHG | BODY MASS INDEX: 26.55 KG/M2

## 2020-11-23 PROCEDURE — 93000 ELECTROCARDIOGRAM COMPLETE: CPT

## 2020-11-23 PROCEDURE — 99213 OFFICE O/P EST LOW 20 MIN: CPT

## 2021-03-10 ENCOUNTER — RX RENEWAL (OUTPATIENT)
Age: 66
End: 2021-03-10

## 2021-03-23 ENCOUNTER — APPOINTMENT (OUTPATIENT)
Dept: CARDIOLOGY | Facility: CLINIC | Age: 66
End: 2021-03-23
Payer: MEDICARE

## 2021-03-23 VITALS
HEART RATE: 80 BPM | WEIGHT: 195 LBS | SYSTOLIC BLOOD PRESSURE: 120 MMHG | TEMPERATURE: 98 F | DIASTOLIC BLOOD PRESSURE: 90 MMHG | HEIGHT: 72 IN | BODY MASS INDEX: 26.41 KG/M2

## 2021-03-23 PROCEDURE — 99213 OFFICE O/P EST LOW 20 MIN: CPT

## 2021-03-23 PROCEDURE — 99072 ADDL SUPL MATRL&STAF TM PHE: CPT

## 2021-03-23 PROCEDURE — 93000 ELECTROCARDIOGRAM COMPLETE: CPT

## 2021-07-21 ENCOUNTER — APPOINTMENT (OUTPATIENT)
Dept: CARDIOLOGY | Facility: CLINIC | Age: 66
End: 2021-07-21
Payer: MEDICARE

## 2021-07-21 VITALS
BODY MASS INDEX: 26.28 KG/M2 | SYSTOLIC BLOOD PRESSURE: 120 MMHG | HEIGHT: 72 IN | TEMPERATURE: 98.1 F | DIASTOLIC BLOOD PRESSURE: 80 MMHG | HEART RATE: 71 BPM | WEIGHT: 194 LBS

## 2021-07-21 PROCEDURE — 99213 OFFICE O/P EST LOW 20 MIN: CPT

## 2021-07-21 PROCEDURE — 93000 ELECTROCARDIOGRAM COMPLETE: CPT

## 2021-07-21 PROCEDURE — 99072 ADDL SUPL MATRL&STAF TM PHE: CPT

## 2021-09-23 NOTE — ED PROVIDER NOTE - DISPOSITION TYPE
Contacted PT per Dr. Diaz about labs results. Results are as follows; Looks good.   PT voiced understanding.           Luis Diaz MD [place], [place], MA  Looks good     
ADMIT

## 2021-10-06 ENCOUNTER — APPOINTMENT (OUTPATIENT)
Dept: UROLOGY | Facility: CLINIC | Age: 66
End: 2021-10-06
Payer: MEDICARE

## 2021-10-06 VITALS — BODY MASS INDEX: 26.28 KG/M2 | WEIGHT: 194 LBS | HEIGHT: 72 IN

## 2021-10-06 DIAGNOSIS — R39.9 UNSPECIFIED SYMPTOMS AND SIGNS INVOLVING THE GENITOURINARY SYSTEM: ICD-10-CM

## 2021-10-06 PROCEDURE — 99204 OFFICE O/P NEW MOD 45 MIN: CPT

## 2021-10-06 NOTE — HISTORY OF PRESENT ILLNESS
[FreeTextEntry1] : 66-year-old with PSA of 4.2 done in March 2021 compared to 3.4 in 2019.  He states he never had an elevation prior.  He has occasional rare sensation of incomplete emptying, occasional urinary frequency, intermittency and urgency in the daytime, rarely a weak stream and no straining.  Nocturia x1.  Prostate symptom score 10 out of 35.  He is not severely bothered by the symptoms

## 2021-10-06 NOTE — PHYSICAL EXAM
[General Appearance - In No Acute Distress] : no acute distress [Edema] : no peripheral edema [] : no respiratory distress [No Prostate Nodules] : no prostate nodules [Prostate Tenderness] : the prostate was not tender [Prostate Size ___ (0-4)] : prostate size [unfilled] (scale: 0-4) [Normal Station and Gait] : the gait and station were normal for the patient's age [Oriented To Time, Place, And Person] : oriented to person, place, and time

## 2021-10-06 NOTE — REVIEW OF SYSTEMS
[Fever] : no fever [Feeling Poorly] : not feeling poorly [Sore Throat] : no sore throat [Chest Pain] : no chest pain [Cough] : no cough [Constipation] : no constipation [Dysuria] : no dysuria [Confused] : no confusion

## 2021-10-06 NOTE — ASSESSMENT
[FreeTextEntry1] : Elevated PSA first time.  Fully discussed with patient.  He is at risk for biopsy due to being on Brilinta and ASA.  Recommend repeat PSA along with creatinine first and if still elevated then MRI prostate to look for nodules and to better calculate PSA density [Urinary Symptom or Sign (788.99\R39.89)] : implantation

## 2021-10-11 ENCOUNTER — APPOINTMENT (OUTPATIENT)
Dept: CARDIOLOGY | Facility: CLINIC | Age: 66
End: 2021-10-11

## 2021-10-11 LAB — CREAT SERPL-MCNC: 1.2 MG/DL

## 2021-10-12 LAB — PSA SERPL-MCNC: 3.61 NG/ML

## 2021-11-03 ENCOUNTER — APPOINTMENT (OUTPATIENT)
Dept: UROLOGY | Facility: CLINIC | Age: 66
End: 2021-11-03
Payer: MEDICARE

## 2021-11-03 VITALS — BODY MASS INDEX: 26.41 KG/M2 | WEIGHT: 195 LBS | HEIGHT: 72 IN

## 2021-11-03 PROCEDURE — 99213 OFFICE O/P EST LOW 20 MIN: CPT

## 2021-11-03 NOTE — HISTORY OF PRESENT ILLNESS
[FreeTextEntry1] : 66 year old with PSA of 4.2 ng/mL in March of 2021 compared to 3.4 ng/mL in 2019. Patient states PSA has always been around below 4.0 ng/mL. Patient presents today with repeat PSA of 3.61 ng/mL done 10/11/2021. Patient states that he feels well. Denies dysuria and gross hematuria. Denies frequency and urgency. Patient reports good urinary stream.

## 2021-11-03 NOTE — ASSESSMENT
[FreeTextEntry1] : PSA is 3.61 ng/mL. This is within patients historic trend. \par Will repeat PSA in 6 months to assess stability. \par Follow up 6 months to review.

## 2021-11-03 NOTE — END OF VISIT
[FreeTextEntry3] : I participated in the obtaining of history, physical exam, formulating a treatment plan, and agree with the above transcription by the physicians assistant

## 2021-11-16 ENCOUNTER — APPOINTMENT (OUTPATIENT)
Dept: CARDIOLOGY | Facility: CLINIC | Age: 66
End: 2021-11-16
Payer: MEDICARE

## 2021-11-16 PROCEDURE — 93000 ELECTROCARDIOGRAM COMPLETE: CPT

## 2021-11-16 PROCEDURE — 99214 OFFICE O/P EST MOD 30 MIN: CPT

## 2021-11-22 ENCOUNTER — RX RENEWAL (OUTPATIENT)
Age: 66
End: 2021-11-22

## 2022-03-04 ENCOUNTER — LABORATORY RESULT (OUTPATIENT)
Age: 67
End: 2022-03-04

## 2022-03-23 ENCOUNTER — APPOINTMENT (OUTPATIENT)
Dept: CARDIOLOGY | Facility: CLINIC | Age: 67
End: 2022-03-23
Payer: MEDICARE

## 2022-03-23 PROCEDURE — 99213 OFFICE O/P EST LOW 20 MIN: CPT

## 2022-03-23 PROCEDURE — 93000 ELECTROCARDIOGRAM COMPLETE: CPT

## 2022-06-07 ENCOUNTER — APPOINTMENT (OUTPATIENT)
Dept: CARDIOLOGY | Facility: CLINIC | Age: 67
End: 2022-06-07
Payer: MEDICARE

## 2022-06-07 PROCEDURE — 93325 DOPPLER ECHO COLOR FLOW MAPG: CPT | Mod: 59

## 2022-06-07 PROCEDURE — 93351 STRESS TTE COMPLETE: CPT

## 2022-06-07 PROCEDURE — 93320 DOPPLER ECHO COMPLETE: CPT

## 2022-08-02 NOTE — ED ADULT NURSE NOTE - NS ED NURSE RECORD ANOTHER VITAL SIGN
Doxycycline Pregnancy And Lactation Text: This medication is Pregnancy Category D and not consider safe during pregnancy. It is also excreted in breast milk but is considered safe for shorter treatment courses. Yes

## 2022-09-07 ENCOUNTER — APPOINTMENT (OUTPATIENT)
Dept: CARDIOLOGY | Facility: CLINIC | Age: 67
End: 2022-09-07

## 2022-09-07 PROCEDURE — 99214 OFFICE O/P EST MOD 30 MIN: CPT | Mod: 25

## 2022-09-07 PROCEDURE — 93000 ELECTROCARDIOGRAM COMPLETE: CPT

## 2022-11-28 ENCOUNTER — LABORATORY RESULT (OUTPATIENT)
Age: 67
End: 2022-11-28

## 2022-12-08 ENCOUNTER — RX RENEWAL (OUTPATIENT)
Age: 67
End: 2022-12-08

## 2022-12-27 ENCOUNTER — RX RENEWAL (OUTPATIENT)
Age: 67
End: 2022-12-27

## 2023-01-05 ENCOUNTER — RX RENEWAL (OUTPATIENT)
Age: 68
End: 2023-01-05

## 2023-01-17 ENCOUNTER — APPOINTMENT (OUTPATIENT)
Dept: UROLOGY | Facility: CLINIC | Age: 68
End: 2023-01-17
Payer: MEDICARE

## 2023-01-17 VITALS
HEIGHT: 72 IN | BODY MASS INDEX: 26.28 KG/M2 | DIASTOLIC BLOOD PRESSURE: 73 MMHG | SYSTOLIC BLOOD PRESSURE: 125 MMHG | WEIGHT: 194 LBS | HEART RATE: 70 BPM

## 2023-01-17 PROCEDURE — 99214 OFFICE O/P EST MOD 30 MIN: CPT

## 2023-01-17 NOTE — ASSESSMENT
[FreeTextEntry1] : 67-year-old with elevated PSA of 4.5 ng/mL.\par \par Recommend MRI of prostate to evaluate for any suspicious prostatic lesions that can be targeted on MRI guided fusion biopsy.  MRI will also allow for calculation of PSA density.\par \par Follow-up 3 to 4 weeks to review MRI.\par Repeat PSA and serum creatinine.

## 2023-01-17 NOTE — END OF VISIT
[FreeTextEntry3] : Participated in obtaining the history, formulating a treatment plan which I discussed with patient agree with the above transcription by the physicians assistant

## 2023-01-17 NOTE — HISTORY OF PRESENT ILLNESS
[FreeTextEntry1] : 67-year-old with elevated PSA.  Most recent PSA November 2022 is 4.51 ng/mL.  He had a PSA March 2021 which is 4.2 ng/mL.  Prior to that his PSA has been in the 3 range.  Repeat PSA in October 2021 was 3.61 ng/mL.  Patient denies family history of prostate cancer.  Patient reports feeling well.  Denies dysuria, gross hematuria, and associated urinary symptoms.

## 2023-02-21 NOTE — H&P ADULT - PMH
[facility]   [address] [place] [address]  [phone]  02/21/23    Maureen Rivera  [address] [place] [address]      Dear Maureen    Your test results from your last visit have returned and they are within the normal range.  If you have any further questions, please feel free to call.   No visits with results within 2 Day(s) from this visit.   Latest known visit with results is:   Lab Services on 02/17/2023   Component Date Value Ref Range Status   • Fasting Status 02/17/2023 16  0 - 999 Hours Final   • Cholesterol 02/17/2023 204 (A)  <=199 mg/dL Final   • Triglycerides 02/17/2023 102  <=149 mg/dL Final   • HDL 02/17/2023 63  >=50 mg/dL Final   • LDL 02/17/2023 121  <=129 mg/dL Final   • Non-HDL Cholesterol 02/17/2023 141  mg/dL Final   • Cholesterol/ HDL Ratio 02/17/2023 3.2  <=4.4 Final   • TSH 02/17/2023 1.100  0.350 - 5.000 mcUnits/mL Final   • Fasting Status 02/17/2023 16  0 - 999 Hours Final   • Sodium 02/17/2023 142  135 - 145 mmol/L Final   • Potassium 02/17/2023 4.4  3.4 - 5.1 mmol/L Final   • Chloride 02/17/2023 105  97 - 110 mmol/L Final   • Carbon Dioxide 02/17/2023 29  21 - 32 mmol/L Final   • Anion Gap 02/17/2023 12  7 - 19 mmol/L Final   • Glucose 02/17/2023 91  70 - 99 mg/dL Final   • BUN 02/17/2023 27 (A)  6 - 20 mg/dL Final   • Creatinine 02/17/2023 1.08 (A)  0.51 - 0.95 mg/dL Final   • Glomerular Filtration Rate 02/17/2023 60  >=60 Final   • BUN/ Creatinine Ratio 02/17/2023 25  7 - 25 Final   • Calcium 02/17/2023 9.4  8.4 - 10.2 mg/dL Final   • Bilirubin, Total 02/17/2023 1.0  0.2 - 1.0 mg/dL Final   • GOT/AST 02/17/2023 16  <=37 Units/L Final   • GPT/ALT 02/17/2023 31  <64 Units/L Final   • Alkaline Phosphatase 02/17/2023 99  45 - 117 Units/L Final   • Albumin 02/17/2023 4.4  3.6 - 5.1 g/dL Final   • Protein, Total 02/17/2023 7.3  6.4 - 8.2 g/dL Final   • Globulin 02/17/2023 2.9  2.0 - 4.0 g/dL Final   • A/G Ratio 02/17/2023 1.5  1.0 - 2.4 Final    • Hemoglobin A1C 02/17/2023 5.2  4.5 - 5.6 % Final           Sincerely,      Valorie Pederson, NP    Atrial fibrillation    Hypertension Atrial fibrillation    Dyslipidemia    Hypertension

## 2023-02-22 ENCOUNTER — APPOINTMENT (OUTPATIENT)
Dept: CARDIOLOGY | Facility: CLINIC | Age: 68
End: 2023-02-22
Payer: MEDICARE

## 2023-02-22 LAB
CREAT SERPL-MCNC: 1.2 MG/DL
EGFR: 65 ML/MIN/1.73M2

## 2023-02-22 PROCEDURE — 99214 OFFICE O/P EST MOD 30 MIN: CPT | Mod: 25

## 2023-02-22 PROCEDURE — 93000 ELECTROCARDIOGRAM COMPLETE: CPT

## 2023-02-23 LAB
PSA FREE FLD-MCNC: 20 %
PSA FREE SERPL-MCNC: 0.97 NG/ML
PSA SERPL-MCNC: 4.84 NG/ML

## 2023-03-27 ENCOUNTER — RESULT REVIEW (OUTPATIENT)
Age: 68
End: 2023-03-27

## 2023-03-27 ENCOUNTER — OUTPATIENT (OUTPATIENT)
Dept: OUTPATIENT SERVICES | Facility: HOSPITAL | Age: 68
LOS: 1 days | End: 2023-03-27
Payer: MEDICARE

## 2023-03-27 DIAGNOSIS — Z00.8 ENCOUNTER FOR OTHER GENERAL EXAMINATION: ICD-10-CM

## 2023-03-27 DIAGNOSIS — R97.20 ELEVATED PROSTATE SPECIFIC ANTIGEN [PSA]: ICD-10-CM

## 2023-03-27 PROCEDURE — 72197 MRI PELVIS W/O & W/DYE: CPT

## 2023-03-27 PROCEDURE — 72197 MRI PELVIS W/O & W/DYE: CPT | Mod: 26

## 2023-03-27 PROCEDURE — A9579: CPT

## 2023-03-28 ENCOUNTER — TRANSCRIPTION ENCOUNTER (OUTPATIENT)
Age: 68
End: 2023-03-28

## 2023-03-28 DIAGNOSIS — R97.20 ELEVATED PROSTATE SPECIFIC ANTIGEN [PSA]: ICD-10-CM

## 2023-04-07 ENCOUNTER — APPOINTMENT (OUTPATIENT)
Dept: UROLOGY | Facility: CLINIC | Age: 68
End: 2023-04-07
Payer: MEDICARE

## 2023-04-07 DIAGNOSIS — R97.20 ELEVATED PROSTATE, SPECIFIC ANTIGEN [PSA]: ICD-10-CM

## 2023-04-07 PROCEDURE — 99214 OFFICE O/P EST MOD 30 MIN: CPT

## 2023-04-07 NOTE — HISTORY OF PRESENT ILLNESS
[FreeTextEntry1] : 67-year-old with BPH and elevated PSA.  Repeat PSA is 4.8 with a creatinine 1.2.  He underwent a MRI of his prostate which showed no suspicious lesions and a prostate volume of 89 cc.  PSA density 0.05 which is normal

## 2023-05-10 RX ORDER — ROSUVASTATIN CALCIUM 20 MG/1
20 TABLET, FILM COATED ORAL
Qty: 90 | Refills: 3 | Status: ACTIVE | COMMUNITY
Start: 2021-03-10 | End: 1900-01-01

## 2023-07-13 ENCOUNTER — LABORATORY RESULT (OUTPATIENT)
Age: 68
End: 2023-07-13

## 2023-08-02 ENCOUNTER — APPOINTMENT (OUTPATIENT)
Dept: CARDIOLOGY | Facility: CLINIC | Age: 68
End: 2023-08-02
Payer: MEDICARE

## 2023-08-02 PROCEDURE — 93000 ELECTROCARDIOGRAM COMPLETE: CPT

## 2023-08-02 PROCEDURE — 99214 OFFICE O/P EST MOD 30 MIN: CPT | Mod: 25

## 2023-10-12 LAB — PSA SERPL-MCNC: 4.56 NG/ML

## 2023-10-20 ENCOUNTER — APPOINTMENT (OUTPATIENT)
Dept: UROLOGY | Facility: CLINIC | Age: 68
End: 2023-10-20
Payer: MEDICARE

## 2023-10-20 PROCEDURE — 99213 OFFICE O/P EST LOW 20 MIN: CPT

## 2023-11-22 ENCOUNTER — APPOINTMENT (OUTPATIENT)
Dept: CARDIOLOGY | Facility: CLINIC | Age: 68
End: 2023-11-22

## 2024-01-23 ENCOUNTER — APPOINTMENT (OUTPATIENT)
Dept: CARDIOLOGY | Facility: CLINIC | Age: 69
End: 2024-01-23
Payer: MEDICARE

## 2024-01-23 VITALS
BODY MASS INDEX: 27.22 KG/M2 | DIASTOLIC BLOOD PRESSURE: 86 MMHG | HEIGHT: 72 IN | SYSTOLIC BLOOD PRESSURE: 138 MMHG | HEART RATE: 82 BPM | WEIGHT: 201 LBS

## 2024-01-23 DIAGNOSIS — I25.10 ATHEROSCLEROTIC HEART DISEASE OF NATIVE CORONARY ARTERY W/OUT ANGINA PECTORIS: ICD-10-CM

## 2024-01-23 DIAGNOSIS — E78.5 HYPERLIPIDEMIA, UNSPECIFIED: ICD-10-CM

## 2024-01-23 DIAGNOSIS — I10 ESSENTIAL (PRIMARY) HYPERTENSION: ICD-10-CM

## 2024-01-23 DIAGNOSIS — Z86.79 PERSONAL HISTORY OF OTHER DISEASES OF THE CIRCULATORY SYSTEM: ICD-10-CM

## 2024-01-23 PROCEDURE — 99214 OFFICE O/P EST MOD 30 MIN: CPT | Mod: 25

## 2024-01-23 PROCEDURE — 93000 ELECTROCARDIOGRAM COMPLETE: CPT

## 2024-01-23 RX ORDER — TICAGRELOR 60 MG/1
60 TABLET ORAL
Qty: 180 | Refills: 3 | Status: ACTIVE | COMMUNITY
Start: 2021-11-22 | End: 1900-01-01

## 2024-01-23 NOTE — HISTORY OF PRESENT ILLNESS
[FreeTextEntry1] : 67 yo male with CAD s/p PCI to pLAD, mLAD and POBA of D1 in 2018, remote a fib, HTN, HLD, BPH who presents for f/u.  Since last visit he is doing well.  He reports no significant cardiac signs or symptoms. Denies chest pain, shortness of breath, palpitations, lightheadedness, syncope, orthopnea, DANDY or PND. He exercises regularly walking 2-3 miles a day.  He recalls PCI with stents placed around 6 years ago. He also believes about 30 years ago he was hospitalized for a fib when his heart was racing. He was put on verapamil but has not been on anticoagulation. He has not had recurrence.  7/2023 labs: , HDL 38, LDL 87; Cr , TSH and LFTs WNL 2022 stress echo: negative; 12.5 METS 2022: echo: normal LVEF, no sig valvular abnormalities

## 2024-01-23 NOTE — ASSESSMENT
[FreeTextEntry1] : --- 67 yo male with CAD s/p PCI to pLAD, mLAD and POBA of D1 in 2018, remote a fib, HTN, HLD, BPH who presents for f/u  CAD - continue ASA 81 and ticagrelor 60mg BID - continue rosuvastatin 20mg (repeat lipid panel at f/u)  HTN - continue valsartan 80 qd - asked he occasionally check BP at home; discussed lifestyle modifications  Hx of a fib (CHADSVasc ~ 3) - remote episode about 30 years ago; discussed that he carries an increased risk of stroke and recommended considering anticoagulation, he would like to defer at this time and continue with current DAPT therapy; also discussed MCOT vs loop recorder to assess for a fib which he deferred at this time  - continue verapamil 240 qd  RTC in 6m

## 2024-01-31 NOTE — DISCHARGE NOTE ADULT - FUNCTIONAL SCREEN CURRENT LEVEL: BATHING, MLM
[facility]   CARDIOVASCULAR THORACIC UNIT (CVTU)  HISTORY AND PHYSICAL EXAM      Patient Name: Km Dunbar    : 1960  MRN: [id number]  Admit Date: 2024   Length of Stay: 2 Days    Subjective  Km Dunbar is a 63 year old male with a past medical history of HTN, CKD, CAD s/p PCI , COPD, lung adenocarcinoma status post RUL lobectomy , chronic hypoxic respiratory failure on 7L home O2, PH on tyvaso and sildenafil. Presented to [facility] ED on  for syncopal event in which patient also presented with acute back pain. X-rays of the lumbar and thoracic spine significant for new mild L1 compression fracture and multiple old compression fractures.  Patient transferred to [facility] on  for pulmonary HTN management.  Patient awaiting transfer to [facility] for lung transplant evaluation.       Upon arrival, patient saturating appropriately on 7L, however oxygen requirements markedly increased and patient was placed on HFNC this AM per chart review. Patient also became slightly hypotensive in which levophed was initiated and 500mL bolus was administered. SWAN catheter was placed at the bedside.     Upon arrival to the CVTU, patient saturating about 95% on HFNC 50L 90% endorsing slight dyspnea at rest which is different from his baseline. Hemodynamics significant for markedly elevated PAP (117/49) (CI 2.2, CO 4.2, CVP 14, SVR 1186, Mvo2 62%). PAP decreased back to patient's baseline (61/35) after Tyvaso treatment (last RHC per chart review PAP 78/34, with similar indices as above). ABG with slightly worsening hypoxemia (PaO2 138-->74) However lactic acid remains normal.  CXR significant for slight increase in bilateral infiltrates, along with elevated filling pressures, in which 40mg IVP lasix was administered. Patient endorsed decrease in shortness of breath-- will wean HFNC as tolerated to obtain oxygen saturations >90%.       ROS negative except for as above    Past Medical, Surgical,  Family and Social History  Patient has a past medical history of Activity intolerance, Adenocarcinoma of right lung (CMD) (12/06/2019), MARCO ANTONIO (acute kidney injury) (CMD) (09/10/2020), Back pain of thoracolumbar region (04/26/2019), Coronary artery disease involving native coronary artery of native heart without angina pectoris (08/19/2020), Erectile dysfunction (04/26/2019), Essential hypertension (07/12/2016), Late effects of spine fusion (07/12/2016), Malignant neoplasm of pancreatic duct (CMD), Mixed hyperlipidemia (08/19/2020), NSTEMI (non-ST elevated myocardial infarction) (CMD) (08/19/2020), Pre-op evaluation (04/05/2021), Pulmonary emphysema (CMD) (04/26/2019), Right lumbar radiculopathy (04/26/2019), Status post lobectomy of lung (12/06/2019), and Syncope, unspecified syncope type (09/02/2023).    He has no past medical history of Malignant hyperthermia.    Patient has a past surgical history that includes Lung lobectomy (Right, 07/09/2019); Inguinal hernia repair (Left, 08/08/2016); Lymph node dissection (Right, 07/09/2019); Coronary angioplasty with stent (08/2020); Total hip arthroplasty (Right, 02/01/2022); Thoracic Fusion (2011); and joint replacement.    Patient family history includes Cancer in his sister; Cancer, Breast in his mother; Heart disease in his father; Patient is unaware of any medical problems in his maternal grandfather, maternal grandmother, paternal grandfather, and paternal grandmother; Stroke/TIA in his brother.    Patient reports that he quit smoking about 12 years ago. His smoking use included cigarettes. He started smoking about 32 years ago. He has a 20.0 pack-year smoking history. He has never used smokeless tobacco. He reports that he does not currently use alcohol. He reports that he does not currently use drugs after having used the following drugs: Marijuana.    Allergies  Patient has No Known Allergies.    Physical Exam  General: NAD appears comfortable  Neuro: alert and  oriented, no focal neuro deficits noted  HEENT: atraumatic, no icterus  Lungs: no respiratory distress, lung sounds diminished in BLL  Cardio:  RRR. Distal pulses 2+ x4  Abdomen: NT, ND, bowel sounds present  Skin: Warm. No rashes.  Extremities: well perfused    Pertinent Labs/Imaging  CBC:   Recent Labs     01/28/24  0810 01/30/24  0244   WBC 9.6 9.0   RBC 4.59 3.88*   HGB 15.3 12.9*   HCT 45.0 37.8*    144     CMP:  Recent Labs     01/29/24  0820 01/30/24  0244 01/30/24  2046   SODIUM 134* 133* 132*   POTASSIUM 4.0 4.3 4.1   CHLORIDE 103 101 104   ANIONGAP 9 11 10   GLUCOSE 100* 91 120*   BUN 18 24* 26*   CREATININE 1.39* 1.82* 1.48*   ALBUMIN 3.3*  --  3.2*   CALCIUM 9.3 9.4 9.2   AST 23  --  26       I&O:   Intake/Output Summary (Last 24 hours) at 1/31/2024 0159  Last data filed at 1/30/2024 2359  Gross per 24 hour   Intake 550 ml   Output 2150 ml   Net -1600 ml        Assessment:  Km Dunbar is a 63 year old male with a past medical history of HTN, CKD, CAD s/p PCI 2008, COPD, lung adenocarcinoma status post RUL lobectomy 2019, chronic hypoxic respiratory failure on 7L home O2, PH on tyvaso and sildenafil. Presented to [facility] ED on 1/28 for syncopal event in which patient also presented with acute back pain. X-rays of the lumbar and thoracic spine significant for new mild L1 compression fracture and multiple old compression fractures.  Patient transferred to [facility] on 1/28 for pulmonary HTN management.  Patient awaiting transfer to [facility] for lung transplant evaluation.     Problems:    Acute L1 compression fracture 2/2 fall  Hx severe thoracolumbar fracture   Recurrent syncope  HTN  CAD s/p PCI 2008  Acute on Chronic Right heart failure  COPD  Pulmonary HTN  Lung adenocarcinoma status post RUL lobectomy 2019  Acute on chronic hypoxemic respiratory failure  MARCO ANTONIO on CKD        Plan:  Neuro/Psych  Acute L1 compression fracture 2/2 fall  Hx severe thoracolumbar fracture   Recurrent syncope  Chronic compression  fractures of T9, T10, T,11, T12, and L2, with new acute compression fracture of L1  Ortho consulted with recommendations for MRI and supportive care in the interm  Pain control with flexeril, PRN morphine, norco  CTH upon arrival without any significant acute findings    Cardiovascular  HTN  CAD s/p PCI 2008  COPD  Pulmonary HTN  Recurrent syncope  Acute on Chronic Right Heart Failure   Hypotension  Hypotension unclear etiology-- continue low dose levophed-- maintain sytemmic SBP > PA SBP  Less likely septic in nature -- patient afebrile without any leukocytosis   Syncope likely related to severity of PH causing transient low flow states and hypoxemia   SWAN placed today 1/30 with initial significantly elevated PAPs as above-- with significant decrease back to baseline s/p Tyvaso administration  PAP (117/49) (CI 2.2, CO 4.2, CVP 14, SVR 1186, Mvo2 62%)  Throughout the night, patient's PAP increasing-- 20PPM Gisela  initiated per PH team   Hemodynamics relatively unchanged in comparison to right heart cath numbers on 1/11/2023  40mg IV lasix administered.   TTE 1/29 without any interval change from the previous   Likely WHO group 3-- in the setting of ILD/COPD  Continue Tyvaso and sildenafil per PH team  Pulmonary hypertension following, appreciate recs   Patient awaiting transfer to [facility] for lung transplant evaluation   Pulmonary  Lung adenocarcinoma status post RUL lobectomy 2019  Acute on chronic hypoxemic respiratory failure  Patient on 7L home O2 -- however, now with increased oxygen needs HFNC 50L 90%-- currently weaning down to 50L 70%-- titrate to maintain oxygen saturations >92%  Increased oxygen needs/hypoxemia now slowly decreasing-- unknown etiology-- could be secondary to volume overload-- work-up still on-going, possibly infections? RVP pending   Consider CTPE when oxygen requirements and respiratory status more stable -- low suspicion   ABG reviewed -- will avoid hypoxemia in the setting of tenuous PAPs     CXR with slight increase in patchy infiltrates-- diuresis as above     Patient awaiting transfer to [facility] for lung transplant evaluation   Pulmonology on consult appreciate recs   GI  GEMA  Renal/Metabolic  CKD  Creatinine baseline appears to be around 1.2-1.6 -- currently fluctuating  Diuresis as above  Continue to monitor electrolytes and replace as needed   Heme/Onc  GEMA  Infectious Disease  GEMA  RVP pending  Endocrine  GEMA  Integument/MSK/Miscellaneous  GEMA      ICU checklist  Feeding/fluids:  Cardiac Diet   Analgesia:  Morphine  Sedation:  N/A  Thromboembolism ppx:  Subcutaneous Heparin   Ulcer ppx:  Pantoprazole  Glycemic control:  none  Mobility:  PT  Bowel Regimen Initiated: Yes  Indwelling Catheter:  N/A  Lines/drains/tubes: CHRISSIE yates 1/30  Deescalation of antibiotics: NA    Code Status: Full Resuscitation    I have spent 55 minutes of critical care time performing evaluation, examination, complex data interpretation and creating a diagnostic and therapeutic plan for this critically ill patient. Dr. Alegria was available for collaboration during this encounter    Vira Quezada NP  Critical Care    (0) independent

## 2024-03-05 RX ORDER — VALSARTAN 80 MG/1
80 TABLET, COATED ORAL
Qty: 90 | Refills: 3 | Status: ACTIVE | COMMUNITY
Start: 2020-09-12 | End: 1900-01-01

## 2024-03-05 RX ORDER — VERAPAMIL HYDROCHLORIDE 240 MG/1
240 TABLET ORAL
Qty: 90 | Refills: 3 | Status: ACTIVE | COMMUNITY
Start: 2022-12-09 | End: 1900-01-01

## 2024-04-18 LAB — PSA SERPL-MCNC: 5.23 NG/ML

## 2024-04-30 ENCOUNTER — APPOINTMENT (OUTPATIENT)
Dept: UROLOGY | Facility: CLINIC | Age: 69
End: 2024-04-30
Payer: MEDICARE

## 2024-04-30 DIAGNOSIS — R35.0 FREQUENCY OF MICTURITION: ICD-10-CM

## 2024-04-30 DIAGNOSIS — N40.1 BENIGN PROSTATIC HYPERPLASIA WITH LOWER URINARY TRACT SYMPMS: ICD-10-CM

## 2024-04-30 DIAGNOSIS — N13.8 BENIGN PROSTATIC HYPERPLASIA WITH LOWER URINARY TRACT SYMPMS: ICD-10-CM

## 2024-04-30 DIAGNOSIS — R97.20 ELEVATED PROSTATE, SPECIFIC ANTIGEN [PSA]: ICD-10-CM

## 2024-04-30 LAB
BILIRUB UR QL STRIP: NORMAL
COLLECTION METHOD: NORMAL
GLUCOSE UR-MCNC: NORMAL
HCG UR QL: 0.2 EU/DL
HGB UR QL STRIP.AUTO: NORMAL
KETONES UR-MCNC: NORMAL
LEUKOCYTE ESTERASE UR QL STRIP: NORMAL
NITRITE UR QL STRIP: NORMAL
PH UR STRIP: 5.5
PROT UR STRIP-MCNC: NORMAL
SP GR UR STRIP: 1.02

## 2024-04-30 PROCEDURE — 81003 URINALYSIS AUTO W/O SCOPE: CPT | Mod: QW

## 2024-04-30 PROCEDURE — 99213 OFFICE O/P EST LOW 20 MIN: CPT | Mod: 25

## 2024-04-30 PROCEDURE — G2211 COMPLEX E/M VISIT ADD ON: CPT

## 2024-04-30 NOTE — ASSESSMENT
[FreeTextEntry1] : Slight increase in PSA from last visit.  Compared to 1 year ago acceptable PSA slope.  PSA density remains low.  Recommend continued monitoring and recheck PSA in 6 months.  BPH.  This is a chronic condition requiring longitudinal follow-up.  Lower urinary tract symptoms stable and not bothersome to patient.  Continue monitoring.

## 2024-04-30 NOTE — HISTORY OF PRESENT ILLNESS
[FreeTextEntry1] : 68-year-old with history of BPH, 89 cc of prostate on MRI, elevated PSA.  PSA was 4.81-year ago when he underwent an MRI of the prostate which showed no suspicious lesions and a low PSA density.  6 months ago his PSA was 4.5 and now it is 5.23.  No change in urinary symptoms including nocturia x 2 with good urinary flow.  History of coronary artery disease.

## 2024-07-16 ENCOUNTER — APPOINTMENT (OUTPATIENT)
Dept: CARDIOLOGY | Facility: CLINIC | Age: 69
End: 2024-07-16

## 2024-07-22 ENCOUNTER — APPOINTMENT (OUTPATIENT)
Dept: CARDIOLOGY | Facility: CLINIC | Age: 69
End: 2024-07-22

## 2024-07-31 ENCOUNTER — APPOINTMENT (OUTPATIENT)
Dept: CARDIOLOGY | Facility: CLINIC | Age: 69
End: 2024-07-31
Payer: MEDICARE

## 2024-07-31 VITALS
SYSTOLIC BLOOD PRESSURE: 104 MMHG | BODY MASS INDEX: 26.55 KG/M2 | WEIGHT: 196 LBS | HEART RATE: 70 BPM | HEIGHT: 72 IN | DIASTOLIC BLOOD PRESSURE: 70 MMHG

## 2024-07-31 DIAGNOSIS — I25.10 ATHEROSCLEROTIC HEART DISEASE OF NATIVE CORONARY ARTERY W/OUT ANGINA PECTORIS: ICD-10-CM

## 2024-07-31 DIAGNOSIS — E78.5 HYPERLIPIDEMIA, UNSPECIFIED: ICD-10-CM

## 2024-07-31 DIAGNOSIS — I10 ESSENTIAL (PRIMARY) HYPERTENSION: ICD-10-CM

## 2024-07-31 DIAGNOSIS — Z87.891 PERSONAL HISTORY OF NICOTINE DEPENDENCE: ICD-10-CM

## 2024-07-31 DIAGNOSIS — Z13.6 ENCOUNTER FOR SCREENING FOR CARDIOVASCULAR DISORDERS: ICD-10-CM

## 2024-07-31 PROCEDURE — 93000 ELECTROCARDIOGRAM COMPLETE: CPT

## 2024-07-31 PROCEDURE — 99214 OFFICE O/P EST MOD 30 MIN: CPT | Mod: 25

## 2024-07-31 NOTE — REVIEW OF SYSTEMS
[SOB] : no shortness of breath [Dyspnea on exertion] : dyspnea during exertion [Chest Discomfort] : no chest discomfort [Lower Ext Edema] : no extremity edema [Leg Claudication] : no intermittent leg claudication [Palpitations] : no palpitations [Orthopnea] : no orthopnea [PND] : no PND [Syncope] : no syncope [Easy Bruising] : a tendency for easy bruising [Negative] : Psychiatric

## 2024-07-31 NOTE — ASSESSMENT
[FreeTextEntry1] : Assessment: #CAD 2018 - s/p PCI to pLAD, mLAD and POBA of D1 in 2018 with Dr. Rebolledo - Sounds like he had UA (SOB) at that time, no recurrence of those symptoms s/p PCI #HTN - controlled #Remote AFib (1990s) - No known recurrence, not on a/c #Former smoker  Plan: - Labs - Screening: Carotid US, KRISTY/PVR, Abd AO US - Continue ASA, Brilinta, discussed discontinuation of Brilinta but he wishes to remain on it at this time - Continue valsartan 80 mg daily - Continue verapamil  mg daily  - Return to clinic in 6 months or sooner PRN

## 2024-07-31 NOTE — HISTORY OF PRESENT ILLNESS
[FreeTextEntry1] : 68M former smoker with CAD s/p PCI to pLAD, mLAD and POBA of D1 in 2018, remote a fib, HTN, HLD, BPH who presents for f/u.  Previous cardiologists: Dr. Juarez, Dr. Zhou  7/31/2024 GL[place] at top of stairs, does not have to stop, quickly recovers, otherwise no chest pain, palpitations, lightheadedness/dizziness, pre-syncope/syncope, or lower extremity edema.   Exercise: Walks outside and on treadmill, stationary bike, Resistance training without symptoms Diet: tres, marie  Quit smoking 1988  Going to [place] 9/2024

## 2024-09-27 ENCOUNTER — EMERGENCY (EMERGENCY)
Facility: HOSPITAL | Age: 69
LOS: 0 days | Discharge: HOME | End: 2024-09-27
Attending: EMERGENCY MEDICINE
Payer: MEDICARE

## 2024-09-27 VITALS
OXYGEN SATURATION: 100 % | TEMPERATURE: 98 F | SYSTOLIC BLOOD PRESSURE: 150 MMHG | WEIGHT: 195.11 LBS | RESPIRATION RATE: 18 BRPM | HEART RATE: 94 BPM | DIASTOLIC BLOOD PRESSURE: 89 MMHG

## 2024-09-27 DIAGNOSIS — Z23 ENCOUNTER FOR IMMUNIZATION: ICD-10-CM

## 2024-09-27 DIAGNOSIS — S61.200A UNSPECIFIED OPEN WOUND OF RIGHT INDEX FINGER WITHOUT DAMAGE TO NAIL, INITIAL ENCOUNTER: ICD-10-CM

## 2024-09-27 DIAGNOSIS — I25.10 ATHEROSCLEROTIC HEART DISEASE OF NATIVE CORONARY ARTERY WITHOUT ANGINA PECTORIS: ICD-10-CM
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
## 2024-09-27 PROCEDURE — 90471 IMMUNIZATION ADMIN: CPT

## 2024-09-27 PROCEDURE — 90715 TDAP VACCINE 7 YRS/> IM: CPT

## 2024-09-27 PROCEDURE — 99283 EMERGENCY DEPT VISIT LOW MDM: CPT | Mod: 25

## 2024-09-27 PROCEDURE — 99284 EMERGENCY DEPT VISIT MOD MDM: CPT

## 2024-09-27 RX ORDER — TETANUS TOXOID, REDUCED DIPHTHERIA TOXOID AND ACELLULAR PERTUSSIS VACCINE, ADSORBED 5; 2.5; 8; 8; 2.5 [IU]/.5ML; [IU]/.5ML; UG/.5ML; UG/.5ML; UG/.5ML
0.5 SUSPENSION INTRAMUSCULAR ONCE
Refills: 0 | Status: COMPLETED | OUTPATIENT
Start: 2024-09-27 | End: 2024-09-27

## 2024-09-27 RX ORDER — LIDOCAINE HCL 20 MG/ML
30 VIAL (ML) INJECTION ONCE
Refills: 0 | Status: COMPLETED | OUTPATIENT
Start: 2024-09-27 | End: 2024-09-27

## 2024-09-27 RX ADMIN — Medication 30 MILLILITER(S): at 07:44

## 2024-09-27 RX ADMIN — TETANUS TOXOID, REDUCED DIPHTHERIA TOXOID AND ACELLULAR PERTUSSIS VACCINE, ADSORBED 0.5 MILLILITER(S): 5; 2.5; 8; 8; 2.5 SUSPENSION INTRAMUSCULAR at 08:06

## 2024-09-27 NOTE — ED PROVIDER NOTE - OBJECTIVE STATEMENT
Pt is a 69y/o male hx of cad won brilinta here for eval of skin avulsion to right 2nbd finger pad. Pt denies weakness, numbenss, redness

## 2024-09-27 NOTE — ED ADULT NURSE NOTE - NS ED NURSE DISCH DISPOSITION
Screen#: [id number]  Screen Date: 2023  Screen Comment: N/A    Screen#: [id number]  Screen Date: 2023  Screen Comment: N/A    
Discharged

## 2024-09-27 NOTE — ED PROVIDER NOTE - PHYSICAL EXAMINATION
VITAL SIGNS: I have reviewed nursing notes and confirm.  CONSTITUTIONAL: Well-developed; well-nourished; in no acute distress.   SKIN: skin avulsion to right 2nd finger pad  HEAD: Normocephalic; atraumatic.  EYES: conjunctiva and sclera clear.  EXT: Normal ROM.  No clubbing, cyanosis or edema.   NEURO: Alert, oriented, grossly unremarkable

## 2024-09-27 NOTE — ED PROVIDER NOTE - PATIENT PORTAL LINK FT
You can access the FollowMyHealth Patient Portal offered by [facility] by registering at the following website: [email or web address]. By joining [facility] FollowMyHealth portal, you will also be able to view your health information using other applications (apps) compatible with our system.

## 2024-10-22 ENCOUNTER — APPOINTMENT (OUTPATIENT)
Dept: UROLOGY | Facility: CLINIC | Age: 69
End: 2024-10-22
Payer: MEDICARE

## 2024-10-22 VITALS
DIASTOLIC BLOOD PRESSURE: 73 MMHG | OXYGEN SATURATION: 99 % | SYSTOLIC BLOOD PRESSURE: 121 MMHG | WEIGHT: 195 LBS | BODY MASS INDEX: 26.41 KG/M2 | RESPIRATION RATE: 18 BRPM | HEIGHT: 72 IN | HEART RATE: 91 BPM

## 2024-10-22 LAB
BILIRUB UR QL STRIP: NORMAL
COLLECTION METHOD: NORMAL
GLUCOSE UR-MCNC: NORMAL
HCG UR QL: 0.2 EU/DL
HGB UR QL STRIP.AUTO: NORMAL
KETONES UR-MCNC: NORMAL
LEUKOCYTE ESTERASE UR QL STRIP: NORMAL
NITRITE UR QL STRIP: NORMAL
PH UR STRIP: 5
PROT UR STRIP-MCNC: NORMAL
SP GR UR STRIP: 1.02

## 2024-10-22 PROCEDURE — 81003 URINALYSIS AUTO W/O SCOPE: CPT | Mod: QW

## 2024-10-22 PROCEDURE — G2211 COMPLEX E/M VISIT ADD ON: CPT

## 2024-10-22 PROCEDURE — 99213 OFFICE O/P EST LOW 20 MIN: CPT | Mod: 25

## 2024-10-24 ENCOUNTER — APPOINTMENT (OUTPATIENT)
Dept: CARDIOLOGY | Facility: CLINIC | Age: 69
End: 2024-10-24

## 2024-10-24 PROCEDURE — 93880 EXTRACRANIAL BILAT STUDY: CPT

## 2024-10-24 PROCEDURE — 93978 VASCULAR STUDY: CPT

## 2024-11-13 ENCOUNTER — APPOINTMENT (OUTPATIENT)
Dept: SURGERY | Facility: CLINIC | Age: 69
End: 2024-11-13

## 2024-11-13 VITALS
WEIGHT: 195 LBS | OXYGEN SATURATION: 98 % | TEMPERATURE: 97.5 F | SYSTOLIC BLOOD PRESSURE: 136 MMHG | BODY MASS INDEX: 26.41 KG/M2 | DIASTOLIC BLOOD PRESSURE: 80 MMHG | HEIGHT: 72 IN | HEART RATE: 78 BPM

## 2024-11-13 DIAGNOSIS — K64.0 FIRST DEGREE HEMORRHOIDS: ICD-10-CM

## 2024-11-13 DIAGNOSIS — K62.5 HEMORRHAGE OF ANUS AND RECTUM: ICD-10-CM

## 2024-11-13 PROCEDURE — 46600 DIAGNOSTIC ANOSCOPY SPX: CPT

## 2024-11-13 PROCEDURE — 99203 OFFICE O/P NEW LOW 30 MIN: CPT | Mod: 25

## 2024-11-13 RX ORDER — HYDROCORTISONE 25 MG/G
2.5 CREAM TOPICAL
Qty: 60 | Refills: 3 | Status: ACTIVE | COMMUNITY
Start: 2024-11-13 | End: 1900-01-01

## 2024-11-14 PROBLEM — K62.5 BLEEDING PER RECTUM: Status: ACTIVE | Noted: 2024-11-14

## 2025-01-13 ENCOUNTER — APPOINTMENT (OUTPATIENT)
Dept: CARDIOLOGY | Facility: CLINIC | Age: 70
End: 2025-01-13
Payer: MEDICARE

## 2025-01-13 PROCEDURE — 93925 LOWER EXTREMITY STUDY: CPT

## 2025-01-21 ENCOUNTER — APPOINTMENT (OUTPATIENT)
Dept: CARDIOLOGY | Facility: CLINIC | Age: 70
End: 2025-01-21
Payer: MEDICARE

## 2025-01-21 VITALS — HEIGHT: 72 IN | BODY MASS INDEX: 27.09 KG/M2 | WEIGHT: 200 LBS | TEMPERATURE: 97.6 F

## 2025-01-21 VITALS — HEART RATE: 69 BPM | SYSTOLIC BLOOD PRESSURE: 124 MMHG | DIASTOLIC BLOOD PRESSURE: 70 MMHG

## 2025-01-21 DIAGNOSIS — I25.10 ATHEROSCLEROTIC HEART DISEASE OF NATIVE CORONARY ARTERY W/OUT ANGINA PECTORIS: ICD-10-CM

## 2025-01-21 DIAGNOSIS — R06.09 OTHER FORMS OF DYSPNEA: ICD-10-CM

## 2025-01-21 DIAGNOSIS — I10 ESSENTIAL (PRIMARY) HYPERTENSION: ICD-10-CM

## 2025-01-21 DIAGNOSIS — Z87.891 PERSONAL HISTORY OF NICOTINE DEPENDENCE: ICD-10-CM

## 2025-01-21 PROCEDURE — 93000 ELECTROCARDIOGRAM COMPLETE: CPT

## 2025-01-21 PROCEDURE — 99214 OFFICE O/P EST MOD 30 MIN: CPT

## 2025-01-21 PROCEDURE — G2211 COMPLEX E/M VISIT ADD ON: CPT

## 2025-01-31 ENCOUNTER — OUTPATIENT (OUTPATIENT)
Dept: OUTPATIENT SERVICES | Facility: HOSPITAL | Age: 70
LOS: 1 days | End: 2025-01-31
Payer: MEDICARE

## 2025-01-31 ENCOUNTER — APPOINTMENT (OUTPATIENT)
Dept: CV DIAGNOSTICS | Facility: HOSPITAL | Age: 70
End: 2025-01-31
Payer: MEDICARE

## 2025-01-31 DIAGNOSIS — I25.10 ATHEROSCLEROTIC HEART DISEASE OF NATIVE CORONARY ARTERY WITHOUT ANGINA PECTORIS: ICD-10-CM

## 2025-01-31 PROCEDURE — 93016 CV STRESS TEST SUPVJ ONLY: CPT

## 2025-01-31 PROCEDURE — 78452 HT MUSCLE IMAGE SPECT MULT: CPT | Mod: 26

## 2025-01-31 PROCEDURE — 93017 CV STRESS TEST TRACING ONLY: CPT

## 2025-01-31 PROCEDURE — 78452 HT MUSCLE IMAGE SPECT MULT: CPT | Mod: MC

## 2025-01-31 PROCEDURE — 93018 CV STRESS TEST I&R ONLY: CPT

## 2025-01-31 PROCEDURE — A9500: CPT

## 2025-02-01 DIAGNOSIS — I25.10 ATHEROSCLEROTIC HEART DISEASE OF NATIVE CORONARY ARTERY WITHOUT ANGINA PECTORIS: ICD-10-CM

## 2025-02-05 ENCOUNTER — NON-APPOINTMENT (OUTPATIENT)
Age: 70
End: 2025-02-05

## 2025-02-05 RX ORDER — METOPROLOL TARTRATE 100 MG/1
100 TABLET ORAL
Qty: 2 | Refills: 0 | Status: ACTIVE | COMMUNITY
Start: 2025-02-05 | End: 1900-01-01

## 2025-02-12 ENCOUNTER — APPOINTMENT (OUTPATIENT)
Dept: SURGERY | Facility: CLINIC | Age: 70
End: 2025-02-12
Payer: MEDICARE

## 2025-02-12 VITALS
WEIGHT: 196 LBS | OXYGEN SATURATION: 98 % | TEMPERATURE: 97 F | SYSTOLIC BLOOD PRESSURE: 124 MMHG | BODY MASS INDEX: 26.55 KG/M2 | DIASTOLIC BLOOD PRESSURE: 82 MMHG | HEART RATE: 76 BPM | HEIGHT: 72 IN

## 2025-02-12 DIAGNOSIS — K64.0 FIRST DEGREE HEMORRHOIDS: ICD-10-CM

## 2025-02-12 DIAGNOSIS — K62.5 HEMORRHAGE OF ANUS AND RECTUM: ICD-10-CM

## 2025-02-12 PROCEDURE — 99213 OFFICE O/P EST LOW 20 MIN: CPT

## 2025-02-28 ENCOUNTER — RESULT REVIEW (OUTPATIENT)
Age: 70
End: 2025-02-28

## 2025-02-28 ENCOUNTER — OUTPATIENT (OUTPATIENT)
Dept: OUTPATIENT SERVICES | Facility: HOSPITAL | Age: 70
LOS: 1 days | End: 2025-02-28
Payer: MEDICARE

## 2025-02-28 DIAGNOSIS — Z00.8 ENCOUNTER FOR OTHER GENERAL EXAMINATION: ICD-10-CM

## 2025-02-28 DIAGNOSIS — I25.10 ATHEROSCLEROTIC HEART DISEASE OF NATIVE CORONARY ARTERY WITHOUT ANGINA PECTORIS: ICD-10-CM

## 2025-02-28 PROCEDURE — 75574 CT ANGIO HRT W/3D IMAGE: CPT

## 2025-02-28 PROCEDURE — 75574 CT ANGIO HRT W/3D IMAGE: CPT | Mod: 26

## 2025-03-01 DIAGNOSIS — I25.10 ATHEROSCLEROTIC HEART DISEASE OF NATIVE CORONARY ARTERY WITHOUT ANGINA PECTORIS: ICD-10-CM

## 2025-03-26 ENCOUNTER — APPOINTMENT (OUTPATIENT)
Dept: CARDIOLOGY | Facility: CLINIC | Age: 70
End: 2025-03-26

## 2025-04-02 ENCOUNTER — LABORATORY RESULT (OUTPATIENT)
Age: 70
End: 2025-04-02

## 2025-04-16 ENCOUNTER — APPOINTMENT (OUTPATIENT)
Dept: UROLOGY | Facility: CLINIC | Age: 70
End: 2025-04-16
Payer: MEDICARE

## 2025-04-16 VITALS
OXYGEN SATURATION: 96 % | HEART RATE: 76 BPM | SYSTOLIC BLOOD PRESSURE: 128 MMHG | RESPIRATION RATE: 18 BRPM | BODY MASS INDEX: 26.45 KG/M2 | DIASTOLIC BLOOD PRESSURE: 81 MMHG | WEIGHT: 195 LBS

## 2025-04-16 DIAGNOSIS — R97.20 ELEVATED PROSTATE, SPECIFIC ANTIGEN [PSA]: ICD-10-CM

## 2025-04-16 DIAGNOSIS — N40.1 BENIGN PROSTATIC HYPERPLASIA WITH LOWER URINARY TRACT SYMPMS: ICD-10-CM

## 2025-04-16 DIAGNOSIS — N13.8 BENIGN PROSTATIC HYPERPLASIA WITH LOWER URINARY TRACT SYMPMS: ICD-10-CM

## 2025-04-16 LAB
BILIRUB UR QL STRIP: NORMAL
COLLECTION METHOD: NORMAL
GLUCOSE UR-MCNC: NORMAL
HCG UR QL: 0.2 EU/DL
HGB UR QL STRIP.AUTO: NORMAL
KETONES UR-MCNC: NORMAL
LEUKOCYTE ESTERASE UR QL STRIP: NORMAL
NITRITE UR QL STRIP: NORMAL
PH UR STRIP: 5.5
PROT UR STRIP-MCNC: NORMAL
SP GR UR STRIP: >=1.03

## 2025-04-16 PROCEDURE — 81003 URINALYSIS AUTO W/O SCOPE: CPT | Mod: QW

## 2025-04-16 PROCEDURE — 99213 OFFICE O/P EST LOW 20 MIN: CPT | Mod: 25

## 2025-07-15 ENCOUNTER — APPOINTMENT (OUTPATIENT)
Dept: CARDIOLOGY | Facility: CLINIC | Age: 70
End: 2025-07-15
Payer: MEDICARE

## 2025-07-15 ENCOUNTER — NON-APPOINTMENT (OUTPATIENT)
Age: 70
End: 2025-07-15

## 2025-07-15 VITALS — HEIGHT: 72 IN | BODY MASS INDEX: 26.28 KG/M2 | WEIGHT: 194 LBS | TEMPERATURE: 97.6 F

## 2025-07-15 VITALS — DIASTOLIC BLOOD PRESSURE: 62 MMHG | SYSTOLIC BLOOD PRESSURE: 120 MMHG | HEART RATE: 74 BPM

## 2025-07-15 LAB
ALBUMIN SERPL ELPH-MCNC: 4.2 G/DL
ALP BLD-CCNC: 71 U/L
ALT SERPL-CCNC: 7 U/L
ANION GAP SERPL CALC-SCNC: 13 MMOL/L
AST SERPL-CCNC: 8 U/L
BILIRUB SERPL-MCNC: 0.4 MG/DL
BUN SERPL-MCNC: 29 MG/DL
CALCIUM SERPL-MCNC: 9.3 MG/DL
CHLORIDE SERPL-SCNC: 107 MMOL/L
CHOLEST SERPL-MCNC: 119 MG/DL
CO2 SERPL-SCNC: 22 MMOL/L
CREAT SERPL-MCNC: 1.4 MG/DL
EGFRCR SERPLBLD CKD-EPI 2021: 54 ML/MIN/1.73M2
ESTIMATED AVERAGE GLUCOSE: 123 MG/DL
GLUCOSE SERPL-MCNC: 109 MG/DL
HBA1C MFR BLD HPLC: 5.9 %
HCT VFR BLD CALC: 35.4 %
HDLC SERPL-MCNC: 29 MG/DL
HGB BLD-MCNC: 10.7 G/DL
LDLC SERPL-MCNC: 71 MG/DL
MCHC RBC-ENTMCNC: 24.8 PG
MCHC RBC-ENTMCNC: 30.2 G/DL
MCV RBC AUTO: 81.9 FL
NONHDLC SERPL-MCNC: 90 MG/DL
PLATELET # BLD AUTO: 324 K/UL
PMV BLD AUTO: 0 /100 WBCS
PMV BLD: 10.7 FL
POTASSIUM SERPL-SCNC: 4.5 MMOL/L
PROT SERPL-MCNC: 7.2 G/DL
RBC # BLD: 4.32 M/UL
RBC # FLD: 14.1 %
SODIUM SERPL-SCNC: 142 MMOL/L
TRIGL SERPL-MCNC: 99 MG/DL
WBC # FLD AUTO: 9.76 K/UL

## 2025-07-15 PROCEDURE — 93000 ELECTROCARDIOGRAM COMPLETE: CPT

## 2025-07-15 PROCEDURE — G2211 COMPLEX E/M VISIT ADD ON: CPT

## 2025-07-15 PROCEDURE — 99214 OFFICE O/P EST MOD 30 MIN: CPT

## 2025-07-21 ENCOUNTER — OUTPATIENT (OUTPATIENT)
Dept: OUTPATIENT SERVICES | Facility: HOSPITAL | Age: 70
LOS: 1 days | End: 2025-07-21
Payer: MEDICARE

## 2025-07-21 DIAGNOSIS — M25.562 PAIN IN LEFT KNEE: ICD-10-CM

## 2025-07-21 DIAGNOSIS — M25.561 PAIN IN RIGHT KNEE: ICD-10-CM

## 2025-07-21 PROCEDURE — 73562 X-RAY EXAM OF KNEE 3: CPT | Mod: 26,50

## 2025-07-21 PROCEDURE — 73562 X-RAY EXAM OF KNEE 3: CPT | Mod: 50

## 2025-07-22 DIAGNOSIS — M25.562 PAIN IN LEFT KNEE: ICD-10-CM

## 2025-07-22 DIAGNOSIS — M25.561 PAIN IN RIGHT KNEE: ICD-10-CM

## 2025-07-28 ENCOUNTER — RX RENEWAL (OUTPATIENT)
Age: 70
End: 2025-07-28